# Patient Record
Sex: FEMALE | Race: ASIAN | NOT HISPANIC OR LATINO | Employment: FULL TIME
[De-identification: names, ages, dates, MRNs, and addresses within clinical notes are randomized per-mention and may not be internally consistent; named-entity substitution may affect disease eponyms.]

---

## 2018-04-02 ENCOUNTER — RECORDS - HEALTHEAST (OUTPATIENT)
Dept: ADMINISTRATIVE | Facility: OTHER | Age: 33
End: 2018-04-02

## 2019-10-18 ENCOUNTER — RECORDS - HEALTHEAST (OUTPATIENT)
Dept: LAB | Facility: CLINIC | Age: 34
End: 2019-10-18

## 2019-10-18 LAB
ALBUMIN SERPL-MCNC: 4 G/DL (ref 3.5–5)
ALP SERPL-CCNC: 39 U/L (ref 45–120)
ALT SERPL W P-5'-P-CCNC: 13 U/L (ref 0–45)
ANION GAP SERPL CALCULATED.3IONS-SCNC: 8 MMOL/L (ref 5–18)
AST SERPL W P-5'-P-CCNC: 16 U/L (ref 0–40)
BILIRUB SERPL-MCNC: 1 MG/DL (ref 0–1)
BUN SERPL-MCNC: 13 MG/DL (ref 8–22)
CALCIUM SERPL-MCNC: 8.8 MG/DL (ref 8.5–10.5)
CHLORIDE BLD-SCNC: 105 MMOL/L (ref 98–107)
CO2 SERPL-SCNC: 25 MMOL/L (ref 22–31)
CREAT SERPL-MCNC: 0.74 MG/DL (ref 0.6–1.1)
GFR SERPL CREATININE-BSD FRML MDRD: >60 ML/MIN/1.73M2
GLUCOSE BLD-MCNC: 97 MG/DL (ref 70–125)
LIPASE SERPL-CCNC: 21 U/L (ref 0–52)
POTASSIUM BLD-SCNC: 3.7 MMOL/L (ref 3.5–5)
PROT SERPL-MCNC: 7.5 G/DL (ref 6–8)
SODIUM SERPL-SCNC: 138 MMOL/L (ref 136–145)

## 2019-12-03 ENCOUNTER — RECORDS - HEALTHEAST (OUTPATIENT)
Dept: LAB | Facility: CLINIC | Age: 34
End: 2019-12-03

## 2019-12-05 LAB — BACTERIA SPEC CULT: NORMAL

## 2020-10-09 ENCOUNTER — RECORDS - HEALTHEAST (OUTPATIENT)
Dept: LAB | Facility: CLINIC | Age: 35
End: 2020-10-09

## 2020-10-09 LAB
BASOPHILS # BLD AUTO: 0 THOU/UL (ref 0–0.2)
BASOPHILS NFR BLD AUTO: 1 % (ref 0–2)
EOSINOPHIL # BLD AUTO: 0.1 THOU/UL (ref 0–0.4)
EOSINOPHIL NFR BLD AUTO: 1 % (ref 0–6)
ERYTHROCYTE [DISTWIDTH] IN BLOOD BY AUTOMATED COUNT: 12.7 % (ref 11–14.5)
HCT VFR BLD AUTO: 36.7 % (ref 35–47)
HGB BLD-MCNC: 12.2 G/DL (ref 12–16)
HIV 1+2 AB+HIV1 P24 AG SERPL QL IA: NEGATIVE
IMM GRANULOCYTES # BLD: 0 THOU/UL
IMM GRANULOCYTES NFR BLD: 0 %
LYMPHOCYTES # BLD AUTO: 1.1 THOU/UL (ref 0.8–4.4)
LYMPHOCYTES NFR BLD AUTO: 17 % (ref 20–40)
MCH RBC QN AUTO: 28.6 PG (ref 27–34)
MCHC RBC AUTO-ENTMCNC: 33.2 G/DL (ref 32–36)
MCV RBC AUTO: 86 FL (ref 80–100)
MONOCYTES # BLD AUTO: 0.3 THOU/UL (ref 0–0.9)
MONOCYTES NFR BLD AUTO: 4 % (ref 2–10)
NEUTROPHILS # BLD AUTO: 5 THOU/UL (ref 2–7.7)
NEUTROPHILS NFR BLD AUTO: 77 % (ref 50–70)
PLATELET # BLD AUTO: 236 THOU/UL (ref 140–440)
PMV BLD AUTO: 11 FL (ref 8.5–12.5)
RBC # BLD AUTO: 4.26 MILL/UL (ref 3.8–5.4)
WBC: 6.5 THOU/UL (ref 4–11)

## 2020-10-10 LAB
ANTIBODY SCREEN: NEGATIVE
T PALLIDUM AB SER QL: NEGATIVE

## 2020-10-11 LAB
BACTERIA SPEC CULT: ABNORMAL
BACTERIA SPEC CULT: ABNORMAL

## 2020-10-12 LAB
ABO/RH(D): NORMAL
ABORH REPEAT: NORMAL
RUBV IGG SERPL QL IA: POSITIVE

## 2020-10-13 LAB
C TRACH DNA SPEC QL PROBE+SIG AMP: NEGATIVE
HBV SURFACE AG SERPL QL IA: ABNORMAL
N GONORRHOEA DNA SPEC QL NAA+PROBE: NEGATIVE

## 2020-10-30 ENCOUNTER — COMMUNICATION - HEALTHEAST (OUTPATIENT)
Dept: SCHEDULING | Facility: CLINIC | Age: 35
End: 2020-10-30

## 2020-10-31 ENCOUNTER — COMMUNICATION - HEALTHEAST (OUTPATIENT)
Dept: SCHEDULING | Facility: CLINIC | Age: 35
End: 2020-10-31

## 2020-11-02 ENCOUNTER — AMBULATORY - HEALTHEAST (OUTPATIENT)
Dept: CARE COORDINATION | Facility: CLINIC | Age: 35
End: 2020-11-02

## 2020-11-02 DIAGNOSIS — U07.1 2019 NOVEL CORONAVIRUS DISEASE (COVID-19): ICD-10-CM

## 2020-11-03 ENCOUNTER — COMMUNICATION - HEALTHEAST (OUTPATIENT)
Dept: NURSING | Facility: CLINIC | Age: 35
End: 2020-11-03

## 2021-06-12 NOTE — PROGRESS NOTES
Clinic Care Coordination Contact    Clinic Care Coordination Contact     Follow Up Progress Note      Reason for Referral:      Intervention/Education provided during outreach: CHW spoke with patient. Review Hospital COVID discharge instructions. Patient will follow up with PCP @ [facility] and make appointment.     Plan: Patient will contact out of network PCP @ [facility] to schedule follow up appointment.     No further Outreach will be done at this time.

## 2021-06-12 NOTE — TELEPHONE ENCOUNTER
"Coronavirus (COVID-19) Notification    Caller Name (Patient, parent, daughter/sone, grandparent, etc)  Patient- Pa Stacey    Reason for call  Notify of Positive Coronavirus (COVID-19) lab results, assess symptoms,  review  [facility] [place] recommendations    Lab Result    Lab test:  2019-nCoV rRt-PCR or SARS-CoV-2 PCR    Oropharyngeal AND/OR nasopharyngeal swabs is POSITIVE for 2019-nCoV RNA/SARS-COV-2 PCR (COVID-19 virus)    RN Recommendations/Instructions per [facility] Coronavirus COVID-19 recommendations    Brief introduction script  Introduce self and then review script:  \"I am calling on behalf of [employer].  We were notified that your Coronavirus test (COVID-19) for was POSITIVE for the virus.  I have some information to relay to you but first I wanted to mention that the [place] Dept of Health will be contacting you shortly [it's possible MD already called Patient] to talk to you more about how you are feeling and other people you have had contact with who might now also have the virus.  Also, [facility] is Partnering with the [facility] for Covid-19 research, you may be contacted directly by research staff.\"    ssessment (Inquire about Patient's current symptoms)   Assessment   Current Symptoms at time of phone call: (if no symptoms, document No symptoms] No symptoms   Symptom onset (if applicable) No symptoms     If at time of call, Patients symptoms hare worsened, the Patient should contact 911 or have someone drive them to Emergency Dept promptly:      If Patient calling 911, inform 911 personal that you have tested positive for the Coronavirus (COVID-19).  Place mask on and await 911 to arrive.    If Emergency Dept, If possible, please have another adult drive you to the Emergency Dept but you need to wear mask when in contact with other people.      Review information with Patient    Your result was positive. This means you have COVID-19 (coronavirus).  We have sent you a letter " that reviews the information that I'll be reviewing with you now.    How can I protect others?    If you have symptoms: stay home and away from others (self-isolate) until:    You've had no fever--and no medicine that reduces fever--for 1 full day (24 hours). And      Your other symptoms have gotten better. For example, your cough or breathing has improved. And     At least 10 days have passed since your symptoms started. (If you ve been told by a doctor that you have a weak immune system, wait 20 days.)     If you don't have symptoms: Stay home and away from others (self-isolate) until at least 10 days have passed since your first positive COVID-19 test. (Date test collected).    During this time:    Stay in your own room, including for meals. Use your own bathroom if you can.    Stay away from others in your home. No hugging, kissing or shaking hands. No visitors.     Don't go to work, school or anywhere else.     Clean  high touch  surfaces often (doorknobs, counters, handles, etc.). Use a household cleaning spray or wipes. You'll find a full list on the EPA website at www.epa.gov/pesticide-registration/list-n-disinfectants-use-against-sars-cov-2.     Cover your mouth and nose with a mask, tissue or other face covering to avoid spreading germs.    Wash your hands and face often with soap and water.    Caregivers in these groups are at risk for severe illness due to COVID-19:  o People 65 years and older  o People who live in a nursing home or long-term care facility  o People with chronic disease (lung, heart, cancer, diabetes, kidney, liver, immunologic)  o People who have a weakened immune system, including those who:  - Are in cancer treatment  - Take medicine that weakens the immune system, such as corticosteroids  - Had a bone marrow or organ transplant  - Have an immune deficiency  - Have poorly controlled HIV or AIDS  - Are obese (body mass index of 40 or higher)  - Smoke regularly    Caregivers should  wear gloves while washing dishes, handling laundry and cleaning bedrooms and bathrooms.    Wash and dry laundry with special caution. Don't shake dirty laundry, and use the warmest water setting you can.    If you have a weakened immune system, ask your doctor about other actions you should take.    For more tips, go to www.cdc.gov/coronavirus/2019-ncov/downloads/10Things.pdf.    You should not go back to work until you meet the guidelines above for ending your home isolation. You don't need to be retested for COVID-19 before going back to work--studies show that you won't spread the virus if it's been at least 10 days since your symptoms started (or 20 days, if you have a weak immune system).    Employers: This document serves as formal notice of your employee's medical guidelines for going back to work. They must meet the above guidelines before going back to work in person.    How can I take care of myself?    1. Get lots of rest. Drink extra fluids (unless a doctor has told you not to).    2. Take Tylenol (acetaminophen) for fever or pain. If you have liver or kidney problems, ask your family doctor if it's okay to take Tylenol.     Take either:     650 mg (two 325 mg pills) every 4 to 6 hours, or     1,000 mg (two 500 mg pills) every 8 hours as needed.     Note: Don't take more than 3,000 mg in one day. Acetaminophen is found in many medicines (both prescribed and over-the-counter medicines). Read all labels to be sure you don't take too much.    For children, check the Tylenol bottle for the right dose (based on their age or weight).    3. If you have other health problems (like cancer, heart failure, an organ transplant or severe kidney disease): Call your specialty clinic if you don't feel better in the next 2 days.    4. Know when to call 911: Emergency warning signs include:    Trouble breathing or shortness of breath    Pain or pressure in the chest that doesn't go away    Feeling confused like you  haven't felt before, or not being able to wake up    Bluish-colored lips or face    5. Sign up for [employer]. We know it's scary to hear that you have COVID-19. We want to track your symptoms to make sure you're okay over the next 2 weeks. Please look for an email from [employer]--this is a free, online program that we'll use to keep in touch. To sign up, follow the link in the email. Learn more at [email or web address].    Where can I get more information?    [facility]: www.[employer] [email or web address]/    Coronavirus Basics: www.health.[facility].[place]./diseases/coronavirus/basics.html    What to Do If You're Sick: www.cdc.gov/coronavirus/2019-ncov/about/steps-when-sick.html    Ending Home Isolation: www.cdc.gov/coronavirus/2019-ncov/hcp/disposition-in-home-patients.html     Caring for Someone with COVID-19: www.cdc.gov/coronavirus/2019-ncov/if-you-are-sick/care-for-someone.html     [facility] clinical trials (COVID-19 research studies): clinicalaffairs.[facility]-clinical-trials     A Positive COVID-19 letter will be sent via [employer] or the Mail.  (Exception, no letters sent to Presurgerical/Preprocedure Patients)    Jennifer Salas RN/[facility]

## 2021-06-12 NOTE — TELEPHONE ENCOUNTER
Tested and found to be positive. Wondering about pregnancy. Recommended she notify her [facility] provider who is caring for her during her pregnancy. She verbalized understanding.

## 2021-06-16 PROBLEM — Z34.90 PREGNANT: Status: ACTIVE | Noted: 2020-12-30

## 2021-06-16 PROBLEM — O02.1 IUFD AT LESS THAN 20 WEEKS OF GESTATION: Status: ACTIVE | Noted: 2020-12-30

## 2021-06-27 ENCOUNTER — HEALTH MAINTENANCE LETTER (OUTPATIENT)
Age: 36
End: 2021-06-27

## 2021-10-17 ENCOUNTER — HEALTH MAINTENANCE LETTER (OUTPATIENT)
Age: 36
End: 2021-10-17

## 2021-11-11 LAB
ABO (EXTERNAL): NORMAL
HEPATITIS B SURFACE ANTIGEN (EXTERNAL): REACTIVE
HEPATITIS C ANTIBODY (EXTERNAL): NONREACTIVE
HIV1+2 AB SERPL QL IA: NONREACTIVE
RH (EXTERNAL): POSITIVE
RUBELLA ANTIBODY IGG (EXTERNAL): NORMAL

## 2021-12-31 ENCOUNTER — HOSPITAL ENCOUNTER (EMERGENCY)
Facility: HOSPITAL | Age: 36
Discharge: HOME | End: 2021-12-31
Attending: STUDENT IN AN ORGANIZED HEALTH CARE EDUCATION/TRAINING PROGRAM | Admitting: STUDENT IN AN ORGANIZED HEALTH CARE EDUCATION/TRAINING PROGRAM
Payer: COMMERCIAL

## 2021-12-31 VITALS
WEIGHT: 110 LBS | OXYGEN SATURATION: 99 % | RESPIRATION RATE: 18 BRPM | DIASTOLIC BLOOD PRESSURE: 72 MMHG | TEMPERATURE: 97.9 F | BODY MASS INDEX: 22.22 KG/M2 | SYSTOLIC BLOOD PRESSURE: 113 MMHG | HEART RATE: 111 BPM

## 2021-12-31 DIAGNOSIS — Z20.822 SUSPECTED 2019 NOVEL CORONAVIRUS INFECTION: ICD-10-CM

## 2021-12-31 LAB
FLUAV RNA SPEC QL NAA+PROBE: POSITIVE
FLUBV RNA RESP QL NAA+PROBE: NEGATIVE
SARS-COV-2 RNA RESP QL NAA+PROBE: NEGATIVE

## 2021-12-31 PROCEDURE — C9803 HOPD COVID-19 SPEC COLLECT: HCPCS

## 2021-12-31 PROCEDURE — 99283 EMERGENCY DEPT VISIT LOW MDM: CPT

## 2021-12-31 PROCEDURE — 87636 SARSCOV2 & INF A&B AMP PRB: CPT | Performed by: STUDENT IN AN ORGANIZED HEALTH CARE EDUCATION/TRAINING PROGRAM

## 2021-12-31 NOTE — ED TRIAGE NOTES
Pt reports headache, cough, runny nose, chills since Tuesday. Is 17 weeks pregnant. Vaccinated for Covid. At home Covid test taken yesterday, which was negative. No abdominal pain.

## 2021-12-31 NOTE — ED PROVIDER NOTES
EMERGENCY DEPARTMENT ENCOUNTER      NAME: Gwyn Ibarra  AGE: 36 year old female  YOB: 1985  MRN: [id number]  EVALUATION DATE & TIME: No admission date for patient encounter.    PCP: No primary care provider on file.    ED PROVIDER: Akash Braun M.D.      Chief Complaint   Patient presents with     Covid Concern         FINAL IMPRESSION:  1. Suspected 2019 novel coronavirus infection          ED COURSE & MEDICAL DECISION MAKING:    Pertinent Labs & Imaging studies reviewed. (See chart for details)  36 year old female presents to the Emergency Department for evaluation of sore throat, mild headache and some weakness.  Also has very mild body aches.  Felt symptoms were consistent with Covid.  Patient nontoxic and generally well-appearing.  We will swab him and discharge him home.    At the conclusion of the encounter I discussed the results of all of the tests and the disposition. The questions were answered. The patient or family acknowledged understanding and was agreeable with the care plan.     MEDICATIONS GIVEN IN THE EMERGENCY:  Medications - No data to display    NEW PRESCRIPTIONS STARTED AT TODAY'S ER VISIT  Discharge Medication List as of 12/31/2021 12:48 PM             =================================================================    HPI    Patient information was obtained from: Patient and         Gwyn Ibarra is a 36 year old female who is 17 weeks pregnant who presents to this EDfor evaluation of COVID-19 symptoms.  Patient has had a sore throat mild headache and some generalized body aches.  She has also had a low-grade fever.  She is 17 weeks pregnant.  She denies any significant chest pain certainly no tearing chest pain.  No shortness of breath.  No other symptoms.  She is having no belly pain or cramping.  No vaginal discharge.      REVIEW OF SYSTEMS   Review of Systems       PAST MEDICAL HISTORY:  No past medical history on file.    PAST SURGICAL HISTORY:  Past Surgical History:    Procedure Laterality Date     LAPAROSCOPY FOR ECTOPIC PREGNANCY Right 10/27/2010     LAPAROSCOPY FOR ECTOPIC PREGNANCY Left 11/27/2012           CURRENT MEDICATIONS:    ibuprofen (ADVIL,MOTRIN) 800 MG tablet        ALLERGIES:  No Known Allergies    FAMILY HISTORY:  No family history on file.    SOCIAL HISTORY:   Social History     Socioeconomic History     Marital status:      Spouse name: Not on file     Number of children: 3     Years of education: Not on file     Highest education level: Not on file   Occupational History     Not on file   Tobacco Use     Smoking status: Never Smoker     Smokeless tobacco: Never Used   Substance and Sexual Activity     Alcohol use: Not Currently     Drug use: Never     Sexual activity: Yes   Other Topics Concern     Not on file   Social History Narrative     Not on file     Social Determinants of Health     Financial Resource Strain: Not on file   Food Insecurity: Not on file   Transportation Needs: Not on file   Physical Activity: Not on file   Stress: Not on file   Social Connections: Not on file   Intimate Partner Violence: Not on file   Housing Stability: Not on file       VITALS:  /72   Pulse 111   Temp 97.9  F (36.6  C) (Temporal)   Resp 18   Wt 49.9 kg (110 lb)   SpO2 99%   BMI 22.22 kg/m        PHYSICAL EXAM    PHYSICAL EXAM    VITAL SIGNS: /72   Pulse 111   Temp 97.9  F (36.6  C) (Temporal)   Resp 18   Wt 49.9 kg (110 lb)   SpO2 99%   BMI 22.22 kg/m    Constitutional:  Well developed, well nourished,  EYES: Conjunctivae clear, no discharge  HENT: Atraumatic, normocephalic, bilateral external ears normal.  Oropharynx moist. Nose normal.   Neck: Normal ROM , Supple   Respiratory:  No respiratory distress, normal nonlabored respirations.   Cardiovascular:  Distal perfusion appears intact  Musculoskeletal:  No edema appreciated, No cyanosis, No clubbing. Good range of motion in all major joints.   Integument:  Warm, Dry, No erythema, No rash.    Neurologic:  Alert and oriented. No focal deficits noted.  Ambulatory  Psychiatric:  Affect normal       LAB:  All pertinent labs reviewed and interpreted.  Labs Ordered and Resulted from Time of ED Arrival to Time of ED Departure - No data to display    RADIOLOGY:  Reviewed all pertinent imaging. Please see official radiology report.  No orders to display         I have independently reviewed and interpreted the EKG(s) documented above.          Akash Braun M.D.  Emergency Medicine  [facility] EMERGENCY DEPARTMENT  [facility] [facility] [id number] [phone]  Dept: [phone]       Akash Braun MD  01/01/22 [id number]

## 2022-03-29 LAB — TREPONEMA PALLIDUM ANTIBODY (EXTERNAL): NONREACTIVE

## 2022-04-09 ENCOUNTER — HOSPITAL ENCOUNTER (OUTPATIENT)
Facility: HOSPITAL | Age: 37
End: 2022-04-09
Admitting: OBSTETRICS & GYNECOLOGY
Payer: COMMERCIAL

## 2022-04-09 ENCOUNTER — HOSPITAL ENCOUNTER (OUTPATIENT)
Facility: HOSPITAL | Age: 37
Discharge: HOME | End: 2022-04-09
Attending: OBSTETRICS & GYNECOLOGY | Admitting: OBSTETRICS & GYNECOLOGY
Payer: COMMERCIAL

## 2022-04-09 VITALS — SYSTOLIC BLOOD PRESSURE: 103 MMHG | TEMPERATURE: 97.8 F | DIASTOLIC BLOOD PRESSURE: 74 MMHG | RESPIRATION RATE: 16 BRPM

## 2022-04-09 PROBLEM — Z36.89 ENCOUNTER FOR TRIAGE IN PREGNANT PATIENT: Status: ACTIVE | Noted: 2022-04-09

## 2022-04-09 LAB
ALBUMIN UR-MCNC: NEGATIVE MG/DL
APPEARANCE UR: CLEAR
BILIRUB UR QL STRIP: NEGATIVE
CLUE CELLS: ABNORMAL
COLOR UR AUTO: COLORLESS
GLUCOSE UR STRIP-MCNC: NEGATIVE MG/DL
HGB UR QL STRIP: NEGATIVE
KETONES UR STRIP-MCNC: NEGATIVE MG/DL
LEUKOCYTE ESTERASE UR QL STRIP: NEGATIVE
NITRATE UR QL: NEGATIVE
PH UR STRIP: 7 [PH] (ref 5–7)
SP GR UR STRIP: 1.01 (ref 1–1.03)
TRICHOMONAS, WET PREP: ABNORMAL
UROBILINOGEN UR STRIP-MCNC: <2 MG/DL
WBC'S/HIGH POWER FIELD, WET PREP: ABNORMAL
YEAST, WET PREP: ABNORMAL

## 2022-04-09 PROCEDURE — 87210 SMEAR WET MOUNT SALINE/INK: CPT | Performed by: OBSTETRICS & GYNECOLOGY

## 2022-04-09 PROCEDURE — G0463 HOSPITAL OUTPT CLINIC VISIT: HCPCS

## 2022-04-09 PROCEDURE — 81003 URINALYSIS AUTO W/O SCOPE: CPT | Performed by: OBSTETRICS & GYNECOLOGY

## 2022-04-09 RX ORDER — PRENATAL VIT/IRON FUM/FOLIC AC 27MG-0.8MG
1 TABLET ORAL DAILY
COMMUNITY

## 2022-04-09 RX ORDER — LIDOCAINE 40 MG/G
CREAM TOPICAL
Status: DISCONTINUED | OUTPATIENT
Start: 2022-04-09 | End: 2022-04-09 | Stop reason: HOSPADM

## 2022-04-09 NOTE — PROVIDER NOTIFICATION
Notified Dr Briscoe of negative UA and neg wet prep. Instructed to check cervix and discharge if not dilated.

## 2022-04-09 NOTE — DISCHARGE INSTRUCTIONS
Discharge Instruction for Undelivered Patients      You were seen for: Labor Assessment  We Consulted: Dr Briscoe  You had (Test or Medicine):UA, Wet prep, Fetal monitoring     Diet:   You may eat meals and snacks.     Activity:      Call your provider if you notice:  Swelling in your face or increased swelling in your hands or legs.  Headaches that are not relieved by Tylenol (acetaminophen).  Changes in your vision (blurring: seeing spots or stars.)  Nausea (sick to your stomach) and vomiting (throwing up).   Weight gain of 5 pounds or more per week.  Heartburn that doesn't go away.  Signs of bladder infection: pain when you urinate (use the toilet), need to go more often and more urgently.  The bag of doty (rupture of membranes) breaks, or you notice leaking in your underwear.  Bright red blood in your underwear.  Abdominal (lower belly) or stomach pain.  For first baby: Contractions (tightening) less than 5 minutes apart for one hour or more.  Second (plus) baby: Contractions (tightening) less than 10 minutes apart and getting stronger.  *If less than 34 weeks: Contractions (tightening) more than 6 times in one hour.  Increase or change in vaginal discharge (note the color and amount)    Follow-up:  As scheduled in the clinic

## 2022-04-09 NOTE — PROGRESS NOTES
Pa states she woke up around [place] with contraction pain that didn't go away so she came to the hospital. She states since she arrived here she feels much better. However her EUFM shows some uterine irritability with contractions about every 10 minutes. Baby is Cat 1 tracing with audible movements. Pa reports she has been voiding more frequently and has a bit of discomfort with it. UA sent to lab.

## 2022-05-19 LAB — GROUP B STREPTOCOCCUS (EXTERNAL): POSITIVE

## 2022-05-24 ENCOUNTER — HOSPITAL ENCOUNTER (INPATIENT)
Facility: HOSPITAL | Age: 37
LOS: 2 days | Discharge: HOME | End: 2022-05-26
Attending: OBSTETRICS & GYNECOLOGY | Admitting: OBSTETRICS & GYNECOLOGY
Payer: COMMERCIAL

## 2022-05-24 LAB
ABO/RH(D): NORMAL
ANTIBODY SCREEN: NEGATIVE
HOLD SPECIMEN: NORMAL
SARS-COV-2 RNA RESP QL NAA+PROBE: NEGATIVE
SPECIMEN EXPIRATION DATE: NORMAL
T PALLIDUM AB SER QL: NONREACTIVE

## 2022-05-24 PROCEDURE — 250N000013 HC RX MED GY IP 250 OP 250 PS 637: Performed by: OBSTETRICS & GYNECOLOGY

## 2022-05-24 PROCEDURE — 86901 BLOOD TYPING SEROLOGIC RH(D): CPT | Performed by: OBSTETRICS & GYNECOLOGY

## 2022-05-24 PROCEDURE — 722N000001 HC LABOR CARE VAGINAL DELIVERY SINGLE

## 2022-05-24 PROCEDURE — 250N000011 HC RX IP 250 OP 636: Performed by: OBSTETRICS & GYNECOLOGY

## 2022-05-24 PROCEDURE — 258N000003 HC RX IP 258 OP 636: Performed by: OBSTETRICS & GYNECOLOGY

## 2022-05-24 PROCEDURE — 87635 SARS-COV-2 COVID-19 AMP PRB: CPT | Performed by: OBSTETRICS & GYNECOLOGY

## 2022-05-24 PROCEDURE — 10907ZC DRAINAGE OF AMNIOTIC FLUID, THERAPEUTIC FROM PRODUCTS OF CONCEPTION, VIA NATURAL OR ARTIFICIAL OPENING: ICD-10-PCS | Performed by: OBSTETRICS & GYNECOLOGY

## 2022-05-24 PROCEDURE — 250N000011 HC RX IP 250 OP 636

## 2022-05-24 PROCEDURE — 120N000001 HC R&B MED SURG/OB

## 2022-05-24 PROCEDURE — 250N000009 HC RX 250: Performed by: OBSTETRICS & GYNECOLOGY

## 2022-05-24 PROCEDURE — 36415 COLL VENOUS BLD VENIPUNCTURE: CPT | Performed by: OBSTETRICS & GYNECOLOGY

## 2022-05-24 PROCEDURE — 86780 TREPONEMA PALLIDUM: CPT | Performed by: OBSTETRICS & GYNECOLOGY

## 2022-05-24 RX ORDER — NALOXONE HYDROCHLORIDE 0.4 MG/ML
0.2 INJECTION, SOLUTION INTRAMUSCULAR; INTRAVENOUS; SUBCUTANEOUS
Status: DISCONTINUED | OUTPATIENT
Start: 2022-05-24 | End: 2022-05-24 | Stop reason: HOSPADM

## 2022-05-24 RX ORDER — PENICILLIN G 3000000 [IU]/50ML
3 INJECTION, SOLUTION INTRAVENOUS EVERY 4 HOURS
Status: DISCONTINUED | OUTPATIENT
Start: 2022-05-24 | End: 2022-05-24 | Stop reason: HOSPADM

## 2022-05-24 RX ORDER — PROCHLORPERAZINE MALEATE 10 MG
10 TABLET ORAL EVERY 6 HOURS PRN
Status: DISCONTINUED | OUTPATIENT
Start: 2022-05-24 | End: 2022-05-24 | Stop reason: HOSPADM

## 2022-05-24 RX ORDER — MISOPROSTOL 200 UG/1
800 TABLET ORAL
Status: DISCONTINUED | OUTPATIENT
Start: 2022-05-24 | End: 2022-05-24 | Stop reason: HOSPADM

## 2022-05-24 RX ORDER — LIDOCAINE 40 MG/G
CREAM TOPICAL
Status: DISCONTINUED | OUTPATIENT
Start: 2022-05-24 | End: 2022-05-24 | Stop reason: HOSPADM

## 2022-05-24 RX ORDER — HYDROCORTISONE 25 MG/G
CREAM TOPICAL 3 TIMES DAILY PRN
Status: DISCONTINUED | OUTPATIENT
Start: 2022-05-24 | End: 2022-05-26 | Stop reason: HOSPADM

## 2022-05-24 RX ORDER — OXYTOCIN/0.9 % SODIUM CHLORIDE 30/500 ML
1-24 PLASTIC BAG, INJECTION (ML) INTRAVENOUS CONTINUOUS
Status: DISCONTINUED | OUTPATIENT
Start: 2022-05-24 | End: 2022-05-24 | Stop reason: HOSPADM

## 2022-05-24 RX ORDER — METHYLERGONOVINE MALEATE 0.2 MG/ML
200 INJECTION INTRAVENOUS
Status: DISCONTINUED | OUTPATIENT
Start: 2022-05-24 | End: 2022-05-26 | Stop reason: HOSPADM

## 2022-05-24 RX ORDER — OXYTOCIN/0.9 % SODIUM CHLORIDE 30/500 ML
100-340 PLASTIC BAG, INJECTION (ML) INTRAVENOUS CONTINUOUS PRN
Status: DISCONTINUED | OUTPATIENT
Start: 2022-05-24 | End: 2022-05-26 | Stop reason: HOSPADM

## 2022-05-24 RX ORDER — MISOPROSTOL 200 UG/1
400 TABLET ORAL
Status: DISCONTINUED | OUTPATIENT
Start: 2022-05-24 | End: 2022-05-26 | Stop reason: HOSPADM

## 2022-05-24 RX ORDER — ACETAMINOPHEN 325 MG/1
650 TABLET ORAL EVERY 4 HOURS PRN
Status: DISCONTINUED | OUTPATIENT
Start: 2022-05-24 | End: 2022-05-26 | Stop reason: HOSPADM

## 2022-05-24 RX ORDER — NALOXONE HYDROCHLORIDE 0.4 MG/ML
0.4 INJECTION, SOLUTION INTRAMUSCULAR; INTRAVENOUS; SUBCUTANEOUS
Status: DISCONTINUED | OUTPATIENT
Start: 2022-05-24 | End: 2022-05-24 | Stop reason: HOSPADM

## 2022-05-24 RX ORDER — METOCLOPRAMIDE HYDROCHLORIDE 5 MG/ML
10 INJECTION INTRAMUSCULAR; INTRAVENOUS EVERY 6 HOURS PRN
Status: DISCONTINUED | OUTPATIENT
Start: 2022-05-24 | End: 2022-05-24 | Stop reason: HOSPADM

## 2022-05-24 RX ORDER — CARBOPROST TROMETHAMINE 250 UG/ML
250 INJECTION, SOLUTION INTRAMUSCULAR
Status: DISCONTINUED | OUTPATIENT
Start: 2022-05-24 | End: 2022-05-24 | Stop reason: HOSPADM

## 2022-05-24 RX ORDER — ONDANSETRON 4 MG/1
4 TABLET, ORALLY DISINTEGRATING ORAL EVERY 6 HOURS PRN
Status: DISCONTINUED | OUTPATIENT
Start: 2022-05-24 | End: 2022-05-24 | Stop reason: HOSPADM

## 2022-05-24 RX ORDER — METHYLERGONOVINE MALEATE 0.2 MG/ML
200 INJECTION INTRAVENOUS
Status: DISCONTINUED | OUTPATIENT
Start: 2022-05-24 | End: 2022-05-24 | Stop reason: HOSPADM

## 2022-05-24 RX ORDER — MISOPROSTOL 200 UG/1
400 TABLET ORAL
Status: DISCONTINUED | OUTPATIENT
Start: 2022-05-24 | End: 2022-05-24 | Stop reason: HOSPADM

## 2022-05-24 RX ORDER — KETOROLAC TROMETHAMINE 30 MG/ML
30 INJECTION, SOLUTION INTRAMUSCULAR; INTRAVENOUS
Status: DISCONTINUED | OUTPATIENT
Start: 2022-05-24 | End: 2022-05-26 | Stop reason: HOSPADM

## 2022-05-24 RX ORDER — MISOPROSTOL 200 UG/1
800 TABLET ORAL
Status: DISCONTINUED | OUTPATIENT
Start: 2022-05-24 | End: 2022-05-26 | Stop reason: HOSPADM

## 2022-05-24 RX ORDER — MODIFIED LANOLIN
OINTMENT (GRAM) TOPICAL
Status: DISCONTINUED | OUTPATIENT
Start: 2022-05-24 | End: 2022-05-26 | Stop reason: HOSPADM

## 2022-05-24 RX ORDER — OXYTOCIN 10 [USP'U]/ML
10 INJECTION, SOLUTION INTRAMUSCULAR; INTRAVENOUS
Status: DISCONTINUED | OUTPATIENT
Start: 2022-05-24 | End: 2022-05-26 | Stop reason: HOSPADM

## 2022-05-24 RX ORDER — OXYTOCIN 10 [USP'U]/ML
10 INJECTION, SOLUTION INTRAMUSCULAR; INTRAVENOUS
Status: DISCONTINUED | OUTPATIENT
Start: 2022-05-24 | End: 2022-05-24 | Stop reason: HOSPADM

## 2022-05-24 RX ORDER — FENTANYL CITRATE 50 UG/ML
50 INJECTION, SOLUTION INTRAMUSCULAR; INTRAVENOUS EVERY 30 MIN PRN
Status: DISCONTINUED | OUTPATIENT
Start: 2022-05-24 | End: 2022-05-24 | Stop reason: HOSPADM

## 2022-05-24 RX ORDER — DOCUSATE SODIUM 100 MG/1
100 CAPSULE, LIQUID FILLED ORAL DAILY
Status: DISCONTINUED | OUTPATIENT
Start: 2022-05-24 | End: 2022-05-26 | Stop reason: HOSPADM

## 2022-05-24 RX ORDER — OXYTOCIN/0.9 % SODIUM CHLORIDE 30/500 ML
340 PLASTIC BAG, INJECTION (ML) INTRAVENOUS CONTINUOUS PRN
Status: DISCONTINUED | OUTPATIENT
Start: 2022-05-24 | End: 2022-05-26 | Stop reason: HOSPADM

## 2022-05-24 RX ORDER — SODIUM CHLORIDE, SODIUM LACTATE, POTASSIUM CHLORIDE, CALCIUM CHLORIDE 600; 310; 30; 20 MG/100ML; MG/100ML; MG/100ML; MG/100ML
INJECTION, SOLUTION INTRAVENOUS CONTINUOUS PRN
Status: DISCONTINUED | OUTPATIENT
Start: 2022-05-24 | End: 2022-05-24 | Stop reason: HOSPADM

## 2022-05-24 RX ORDER — PENICILLIN G POTASSIUM 5000000 [IU]/1
5 INJECTION, POWDER, FOR SOLUTION INTRAMUSCULAR; INTRAVENOUS ONCE
Status: COMPLETED | OUTPATIENT
Start: 2022-05-24 | End: 2022-05-24

## 2022-05-24 RX ORDER — ONDANSETRON 2 MG/ML
4 INJECTION INTRAMUSCULAR; INTRAVENOUS EVERY 6 HOURS PRN
Status: DISCONTINUED | OUTPATIENT
Start: 2022-05-24 | End: 2022-05-24 | Stop reason: HOSPADM

## 2022-05-24 RX ORDER — BISACODYL 10 MG
10 SUPPOSITORY, RECTAL RECTAL DAILY PRN
Status: DISCONTINUED | OUTPATIENT
Start: 2022-05-24 | End: 2022-05-26 | Stop reason: HOSPADM

## 2022-05-24 RX ORDER — PROCHLORPERAZINE 25 MG
25 SUPPOSITORY, RECTAL RECTAL EVERY 12 HOURS PRN
Status: DISCONTINUED | OUTPATIENT
Start: 2022-05-24 | End: 2022-05-24 | Stop reason: HOSPADM

## 2022-05-24 RX ORDER — CITRIC ACID/SODIUM CITRATE 334-500MG
30 SOLUTION, ORAL ORAL
Status: DISCONTINUED | OUTPATIENT
Start: 2022-05-24 | End: 2022-05-24 | Stop reason: HOSPADM

## 2022-05-24 RX ORDER — OXYTOCIN/0.9 % SODIUM CHLORIDE 30/500 ML
340 PLASTIC BAG, INJECTION (ML) INTRAVENOUS CONTINUOUS PRN
Status: DISCONTINUED | OUTPATIENT
Start: 2022-05-24 | End: 2022-05-24 | Stop reason: HOSPADM

## 2022-05-24 RX ORDER — IBUPROFEN 800 MG/1
800 TABLET, FILM COATED ORAL EVERY 6 HOURS PRN
Status: DISCONTINUED | OUTPATIENT
Start: 2022-05-24 | End: 2022-05-26 | Stop reason: HOSPADM

## 2022-05-24 RX ORDER — IBUPROFEN 800 MG/1
800 TABLET, FILM COATED ORAL
Status: DISCONTINUED | OUTPATIENT
Start: 2022-05-24 | End: 2022-05-26 | Stop reason: HOSPADM

## 2022-05-24 RX ORDER — CARBOPROST TROMETHAMINE 250 UG/ML
250 INJECTION, SOLUTION INTRAMUSCULAR
Status: DISCONTINUED | OUTPATIENT
Start: 2022-05-24 | End: 2022-05-26 | Stop reason: HOSPADM

## 2022-05-24 RX ORDER — METOCLOPRAMIDE 10 MG/1
10 TABLET ORAL EVERY 6 HOURS PRN
Status: DISCONTINUED | OUTPATIENT
Start: 2022-05-24 | End: 2022-05-24 | Stop reason: HOSPADM

## 2022-05-24 RX ORDER — FENTANYL CITRATE 50 UG/ML
INJECTION, SOLUTION INTRAMUSCULAR; INTRAVENOUS
Status: COMPLETED
Start: 2022-05-24 | End: 2022-05-24

## 2022-05-24 RX ADMIN — PENICILLIN G POTASSIUM 5 MILLION UNITS: 5000000 POWDER, FOR SOLUTION INTRAMUSCULAR; INTRAPLEURAL; INTRATHECAL; INTRAVENOUS at 06:34

## 2022-05-24 RX ADMIN — DOCUSATE SODIUM 100 MG: 100 CAPSULE, LIQUID FILLED ORAL at 23:23

## 2022-05-24 RX ADMIN — FENTANYL CITRATE 50 MCG: 50 INJECTION INTRAMUSCULAR; INTRAVENOUS at 19:00

## 2022-05-24 RX ADMIN — Medication 1 MILLI-UNITS/MIN: at 14:02

## 2022-05-24 RX ADMIN — PENICILLIN G 3 MILLION UNITS: 3000000 INJECTION, SOLUTION INTRAVENOUS at 10:30

## 2022-05-24 RX ADMIN — FENTANYL CITRATE 50 MCG: 50 INJECTION INTRAMUSCULAR; INTRAVENOUS at 19:30

## 2022-05-24 RX ADMIN — KETOROLAC TROMETHAMINE 30 MG: 30 INJECTION, SOLUTION INTRAMUSCULAR at 21:35

## 2022-05-24 RX ADMIN — PENICILLIN G 3 MILLION UNITS: 3000000 INJECTION, SOLUTION INTRAVENOUS at 18:52

## 2022-05-24 RX ADMIN — WITCH HAZEL: 500 SOLUTION RECTAL; TOPICAL at 23:20

## 2022-05-24 RX ADMIN — SODIUM CHLORIDE, POTASSIUM CHLORIDE, SODIUM LACTATE AND CALCIUM CHLORIDE: 600; 310; 30; 20 INJECTION, SOLUTION INTRAVENOUS at 18:07

## 2022-05-24 RX ADMIN — PENICILLIN G 3 MILLION UNITS: 3000000 INJECTION, SOLUTION INTRAVENOUS at 14:35

## 2022-05-24 RX ADMIN — SODIUM CHLORIDE, POTASSIUM CHLORIDE, SODIUM LACTATE AND CALCIUM CHLORIDE 500 ML: 600; 310; 30; 20 INJECTION, SOLUTION INTRAVENOUS at 06:33

## 2022-05-24 RX ADMIN — LIDOCAINE HYDROCHLORIDE 5 ML: 10 INJECTION, SOLUTION EPIDURAL; INFILTRATION; INTRACAUDAL; PERINEURAL at 20:29

## 2022-05-24 RX ADMIN — BENZOCAINE AND LEVOMENTHOL: 200; 5 SPRAY TOPICAL at 23:20

## 2022-05-24 ASSESSMENT — ACTIVITIES OF DAILY LIVING (ADL)
WEAR_GLASSES_OR_BLIND: NO
WALKING_OR_CLIMBING_STAIRS_DIFFICULTY: NO
CHANGE_IN_FUNCTIONAL_STATUS_SINCE_ONSET_OF_CURRENT_ILLNESS/INJURY: NO
FALL_HISTORY_WITHIN_LAST_SIX_MONTHS: NO
DRESSING/BATHING_DIFFICULTY: NO
TOILETING_ISSUES: NO
DIFFICULTY_COMMUNICATING: NO
DIFFICULTY_EATING/SWALLOWING: NO
CONCENTRATING,_REMEMBERING_OR_MAKING_DECISIONS_DIFFICULTY: NO
DOING_ERRANDS_INDEPENDENTLY_DIFFICULTY: NO

## 2022-05-24 NOTE — H&P
[facility]    History and Physical       Date of Admission:  2022    History of Present Illness   Gwyn Ibarra is a 36 year old female  37w3d who presents in labor.     Estimated Date of Delivery: 2022 is calculated from IVF transfer date.     OB COMPLICATIONS:  - IVF pregnancy  - Hepatitis B positive   - AMA: negative NIPT  - Hx ectopic pregnancy x2 s/p laparotomy    - Hx 20w IUFD ()  - GBS positive    Past Medical History    History reviewed. No pertinent past medical history.    Past Surgical History   Past Surgical History:   Procedure Laterality Date     LAPAROSCOPY FOR ECTOPIC PREGNANCY Right 10/27/2010     LAPAROSCOPY FOR ECTOPIC PREGNANCY Left 2012       OB History    Para Term  AB Living   7 3 3 0 3 3   SAB IAB Ectopic Multiple Live Births   0 0 2 0 3      # Outcome Date GA Lbr Dony/2nd Weight Sex Delivery Anes PTL Lv   7 Current            6 AB 20 19w4d  0.1 kg (3.5 oz)   None  FD      Name: BINH,BABY-PA FD      Apgar1: 0  Apgar5: 0   5 Ectopic 12              Birth Comments: left   4 Ectopic 10/26/10              Birth Comments: right   3 Term 10/29/07   2.92 kg (6 lb 7 oz) M Vag-Spont   SEKOU   2 Term 01/10/05   2.892 kg (6 lb 6 oz) F Vag-Spont   SEKOU   1 Term 03   2.892 kg (6 lb 6 oz) M Vag-Spont   SEKOU      Social History   Social History     Tobacco Use     Smoking status: Never Smoker     Smokeless tobacco: Never Used   Substance Use Topics     Alcohol use: Not Currently     Drug use: Never      Family History   History reviewed. No pertinent family history.     Prior to Admission Medications   Prior to Admission Medications   Prescriptions Last Dose Informant Patient Reported? Taking?   Prenatal Vit-Fe Fumarate-FA (PRENATAL MULTIVITAMIN W/IRON) 27-0.8 MG tablet 2022 at Unknown time  Yes Yes   Sig: Take 1 tablet by mouth daily   doxylamine (UNISOM) 25 MG TABS tablet Past Month at Unknown time  Yes Yes   Sig: Take 12.5  mg by mouth At Bedtime As needed if unable to fall asleep.      Facility-Administered Medications: None     Allergies   No Known Allergies    Physical Exam   Vital Signs with Ranges  Temp:  [98.2  F (36.8  C)] 98.2  F (36.8  C)  Resp:  [16] 16    Gen: no acute distress, resting comfortably   SVE: /-3 per RN  Abd: gravid, soft, nontender   Extremities: soft, nontender     Recent Labs   Lab Test 20  1018   AS Negative     Recent Labs   Lab Test 22  0000 10/09/20  0935   HEPBANG  --  Positive, Confirm*   HIAGAB  --  Negative   GBS Positive*  --    RUQIGG  --  Positive     BSUS: Vertex    Fetal Heart Tones: 145/mod tiago/+accel/-decel  TOCO:  q2-3min    Assessment & Plan   Gwyn MOORE Stacey is a 37yo  37w3d in labor  Hep B positive   Cat I FHT     PLAN:   1. Vitals: wnl  2. FHT: Reassuring  3. Labor:  - Continue expectant management  - Antibiotics for GBS positive  4. Hep B positive:  - Avoid AROM, FSE.  Plan for Baby Vaccination and HBIG at delivery    MD June Bojorquez MD

## 2022-05-24 NOTE — PLAN OF CARE
Problem: Delayed Labor Progression (Labor)  Goal: Effective Progression to Delivery  Outcome: Ongoing, Not Progressing     Problem: Uterine Tachysystole (Labor)  Goal: Normal Uterine Contraction Pattern  Outcome: Ongoing, Not Progressing     Problem: Plan of Care - These are the overarching goals to be used throughout the patient stay.    Goal: Plan of Care Review/Shift Note  Description: The Plan of Care Review/Shift note should be completed every shift.  The Outcome Evaluation is a brief statement about your assessment that the patient is improving, declining, or no change.  This information will be displayed automatically on your shift note.  Outcome: Ongoing, Progressing  Flowsheets (Taken 5/24/2022 1453)  Plan of Care Reviewed With:   patient   spouse  Overall Patient Progress: improving     Problem: Change in Fetal Wellbeing (Labor)  Goal: Stable Fetal Wellbeing  Outcome: Ongoing, Progressing  Intervention: Promote and Monitor Fetal Wellbeing  Recent Flowsheet Documentation  Taken 5/24/2022 0730 by Beatrice Tang RN  Body Position:   tilted   left     Problem: Labor Pain (Labor)  Goal: Acceptable Pain Control  Outcome: Ongoing, Progressing     Problem: Plan of Care - These are the overarching goals to be used throughout the patient stay.    Goal: Absence of Hospital-Acquired Illness or Injury  Intervention: Prevent Skin Injury  Recent Flowsheet Documentation  Taken 5/24/2022 0730 by Beatrice Tang RN  Body Position:   tilted   left  Intervention: Prevent and Manage VTE (Venous Thromboembolism) Risk  Recent Flowsheet Documentation  Taken 5/24/2022 0730 by Beatrice Tang, RN  Activity Management:   activity adjusted per tolerance   up ad no

## 2022-05-24 NOTE — PROGRESS NOTES
Pt present to [facility] for c/o for labor that started last evening, pt denied leaking of fluids, vaginal bleeding, headache, visual changes and epigastric pain.  Pt reports good fetal movements.     Pt oriented to room and call light, EMF and toco applied.  SVE done. Vs stable.    Contacting very 3-4 minutes apart, reports as mild. Palpating mild.  EFM: category one.    Dr. Cox updated on pt's arrival,fetal tracing and contractions.

## 2022-05-24 NOTE — PROVIDER NOTIFICATION
Dr. Nya Padilla paged for status update, SVE 6/60-70%/-1 at 1835, and pt requesting IV fentanyl for increased pain.

## 2022-05-24 NOTE — PROGRESS NOTES
Dr Terrell given update on patient status, SVE unchanged and patient desires to wait and break her water.  Dr Terrell gave telephone order to start low dose pitocin at 1mu and going up by 1mu every 30 minutes. Patient is agreeable to starting pitocin at this time.    Update: Pitocin delayed start due to provider going to surgery and requesting to hold off until he is out of surgery to start.

## 2022-05-24 NOTE — PROGRESS NOTES
1634: SVE per RN, 4 cm / 50% / -2 with bloody show. Verified by preceptor, Ratna Conner RNC. Ctxs every 2-6 min with at least 60 sec rest periods; no increase in pit from 5mu at this time.

## 2022-05-24 NOTE — PROGRESS NOTES
Dr. Terrell in department, given update by RN that there was no change on SVE -  4/50/-2.  Patient reporting she's comfortable at this time.  Dr. Terrell to bedside.  Discussed plan of care options with patient.  Patient and  consent to AROM.  Pa reporting contractions stronger at this time.  Contractions palpate moderate-strong.  Category 1 tracing with many accels.  Patient declines pain medications at this time and will ask for them if she is interested. Will continue to offer labor support.  No new orders at this time.

## 2022-05-25 PROBLEM — O02.1 IUFD AT LESS THAN 20 WEEKS OF GESTATION: Status: RESOLVED | Noted: 2020-12-30 | Resolved: 2022-05-25

## 2022-05-25 PROBLEM — Z36.89 ENCOUNTER FOR TRIAGE IN PREGNANT PATIENT: Status: RESOLVED | Noted: 2022-04-09 | Resolved: 2022-05-25

## 2022-05-25 PROBLEM — Z34.90 PREGNANT: Status: RESOLVED | Noted: 2020-12-30 | Resolved: 2022-05-25

## 2022-05-25 LAB — HGB BLD-MCNC: 9.1 G/DL (ref 11.7–15.7)

## 2022-05-25 PROCEDURE — 250N000013 HC RX MED GY IP 250 OP 250 PS 637: Performed by: OBSTETRICS & GYNECOLOGY

## 2022-05-25 PROCEDURE — 120N000001 HC R&B MED SURG/OB

## 2022-05-25 PROCEDURE — 85018 HEMOGLOBIN: CPT | Performed by: OBSTETRICS & GYNECOLOGY

## 2022-05-25 PROCEDURE — 36415 COLL VENOUS BLD VENIPUNCTURE: CPT | Performed by: OBSTETRICS & GYNECOLOGY

## 2022-05-25 RX ADMIN — IBUPROFEN 800 MG: 800 TABLET ORAL at 11:46

## 2022-05-25 RX ADMIN — IBUPROFEN 800 MG: 800 TABLET ORAL at 04:21

## 2022-05-25 RX ADMIN — IBUPROFEN 800 MG: 800 TABLET ORAL at 20:33

## 2022-05-25 RX ADMIN — ACETAMINOPHEN 650 MG: 325 TABLET, FILM COATED ORAL at 14:22

## 2022-05-25 RX ADMIN — ACETAMINOPHEN 650 MG: 325 TABLET, FILM COATED ORAL at 09:02

## 2022-05-25 RX ADMIN — DOCUSATE SODIUM 100 MG: 100 CAPSULE, LIQUID FILLED ORAL at 09:03

## 2022-05-25 ASSESSMENT — ACTIVITIES OF DAILY LIVING (ADL)
ADLS_ACUITY_SCORE: 18

## 2022-05-25 NOTE — PLAN OF CARE
Problem: Pain (Postpartum Vaginal Delivery)  Goal: Acceptable Pain Control  Outcome: Ongoing, Progressing  Intervention: Prevent or Manage Pain  Recent Flowsheet Documentation  Taken 5/25/2022 1422 by Rand Guerrero RN  Pain Management Interventions: medication (see MAR)  Taking pain medication prn in order to stay on top of pain management.      Problem: Urinary Retention (Postpartum Vaginal Delivery)  Goal: Effective Urinary Elimination  Outcome: Ongoing, Progressing  Up ambulating and voiding in room independently.

## 2022-05-25 NOTE — PROCEDURES
VAGINAL DELIVERY NOTE    PRE DELIVERY DIAGNOSIS  1) Intrauterine pregnancy at 37w 3d Gestational age    POST DELIVERY DIAGNOSIS  1) Intrauterine pregnancy at 37w 3d Gestational age  2) Delivery of a female living infant.  3) Apgars of 9 at one minute, 9 at 5 minutes  4) weight unavailable at this time    PROBLEM LIST:  Patient Active Problem List   Diagnosis     Acute Pharyngitis     IUFD at less than 20 weeks of gestation     Pregnant     Encounter for triage in pregnant patient     Labor without complication     Delivery Method/Type:       PROVIDER: Vaishnavi Padilla MD     EPISIOTOMY or INCISION: None    INDICATION FOR INSTRUMENTAL DELIVERY: None    PLACENTA AND CORD:  Mechanism: Spontaneous  Description:  Complete, 3 vessel cord    Delivery QBL (mL): 50 cc    COMPLICATIONS: None    DESCRIPTION OF PROCEDURE:  36 year old  with PNC w/ Dr. Cox and pregnancy complicated by IVF pregnancy, Hepatitis B, AMA, Hx of 20 week IUFD and positive GBS. Her antepartum course was uncomplicated. Patient progressed to complete with artificial rupture of membranes and pitocin. She was prepped and draped in the usual fashion. Nurse present. Infant spontaneously delivered over an intact perineum. Placenta spontaneously delivered. Anesthesia - Local 1% lidocaine. Repair of a 2nd degree was performed with 3.0 vicryl.    Delivery was complicated by nothing. Interventions included uterine massage.    Infant:  Live, vigorous infant with apgars of 9/9. Infant was handed to mom. Infant moved all extremities.    Vaishnavi Padilla MD  (P) [phone]

## 2022-05-25 NOTE — PLAN OF CARE
Problem: Plan of Care - These are the overarching goals to be used throughout the patient stay.    Goal: Plan of Care Review/Shift Note  Description: The Plan of Care Review/Shift note should be completed every shift.  The Outcome Evaluation is a brief statement about your assessment that the patient is improving, declining, or no change.  This information will be displayed automatically on your shift note.  Outcome: Ongoing, Progressing   Patient's vital signs are stable.  She is able to ambulate to bathroom independently.  She is managing pain with ibuprofen, tucks, ice, and dermoplast.  Goal is for patient to rate her pain less than or equal to 3.

## 2022-05-25 NOTE — PLAN OF CARE
VSS. FFU, light rubra lochia, no clots expressed. Vigorous female infant delivered via  at . Baby put skin-to-skin for about 1 hour after birth. Pt encouraged to initiate breastfeeding, but opted to formula-feed at this time. Will continue to offer support. Parents want baby meds given, including Hbig.    Problem: Plan of Care - These are the overarching goals to be used throughout the patient stay.    Goal: Plan of Care Review/Shift Note  2022 by Manda Frias, RN  Outcome: Ongoing, Progressing  2022 by Manda Frias, RN  Outcome: Ongoing, Progressing  Flowsheets (Taken 2022)  Plan of Care Reviewed With:   patient   spouse  Overall Patient Progress: improving     Problem: Bleeding (Labor)  Goal: Hemostasis  Outcome: Ongoing, Progressing

## 2022-05-25 NOTE — PROGRESS NOTES
1928: pt requested 2nd fentanyl dose. SVE per RN 6-7cm / 90% / 0.    1934: Pitocin decreased to 3mu to allow for 60 sec rest period between ctxs. IV fentanyl given x2 for increased labor pain.

## 2022-05-25 NOTE — DISCHARGE SUMMARY
[facility] Discharge Summary    Gwyn Ibarra MRN# [id number]   Age: 36 year old YOB: 1985     Date of Admission:  5/24/2022  Date of Discharge::  5/25/2022  Admitting Physician:  June Cox MD  Discharge Physician:  Oksana Ramos MD     Home clinic: [facility]              Admission Diagnoses:   Encounter for triage in pregnant patient [Z36.89]  Labor without complication [O80]   IVF pregnancy          Discharge Diagnosis:   Normal spontaneous vaginal delivery  Intrauterine pregnancy at 37 weeks gestation   acute blood loss anemia         Procedures:   Procedure(s): spontaneous vaginal delivery                    Medications Prior to Admission:     Medications Prior to Admission   Medication Sig Dispense Refill Last Dose     doxylamine (UNISOM) 25 MG TABS tablet Take 12.5 mg by mouth At Bedtime As needed if unable to fall asleep.   Past Month at Unknown time     Prenatal Vit-Fe Fumarate-FA (PRENATAL MULTIVITAMIN W/IRON) 27-0.8 MG tablet Take 1 tablet by mouth daily   5/23/2022 at Unknown time             Discharge Medications:     Current Discharge Medication List      CONTINUE these medications which have NOT CHANGED    Details   doxylamine (UNISOM) 25 MG TABS tablet Take 12.5 mg by mouth At Bedtime As needed if unable to fall asleep.      Prenatal Vit-Fe Fumarate-FA (PRENATAL MULTIVITAMIN W/IRON) 27-0.8 MG tablet Take 1 tablet by mouth daily                   Consultations:   No consultations were requested during this admission          Brief History of Labor:   The patient was admitted in labor,  she progressed through normal labor curve to complete.  spontaneous vaginal delivery without complications              Hospital Course:   The patient's hospital course was unremarkable.  On discharge, her pain was well controlled. Vaginal bleeding is similar to peak menstrual flow.  Voiding without difficulty.  Ambulating well and tolerating a normal diet.  No fever.   Breastfeeding well.  Infant is stable.  No bowel movement yet.*  She was discharged on post-partum day #1.    Post-partum hemoglobin:   Hemoglobin   Date Value Ref Range Status   05/25/2022 9.1 (L) 11.7 - 15.7 g/dL Final             Discharge Instructions and Follow-Up:   Discharge diet: Regular   Discharge activity: No sex for 6 week(s)   Discharge follow-up: Follow up with Dr. Cox  in 6 weeks   Wound care:            Discharge Disposition:   Discharged to home      Attestation:  I have reviewed today's vital signs, notes, medications, labs and imaging.    Oksana Ramos MD

## 2022-05-25 NOTE — LACTATION NOTE
This note was copied from a baby's chart.  Lactation consultant to patient room per RN request as mom mentioned she might want to try breastfeeding.Mom has 4 children ages 14-19 that she did not breastfeed. Is interested in breastfeeding due to infant formula shortage, but has not breast fed, hand expressed or pumped since delivery.    Infant was just fed formula bottle. Reviewed benefits of breastfeeding, how the body begins to make milk, normal milk volumes the first week, the benefit of skin to skin prior to feeding to help get baby ready for feeding, importance of feeding baby on early hunger cues, and breastfeeding 8-12 times in 24 hours for optimal infant nutrition and hydration as well as for building an optimal milk supply.     Instructed mom to call bedside RN for assistance with breastfeeding tonight, and lactation to see her tomorrow.    Answered questions and gave encouragement.

## 2022-05-26 VITALS
HEART RATE: 76 BPM | RESPIRATION RATE: 16 BRPM | TEMPERATURE: 98.2 F | SYSTOLIC BLOOD PRESSURE: 95 MMHG | WEIGHT: 131.4 LBS | DIASTOLIC BLOOD PRESSURE: 66 MMHG | OXYGEN SATURATION: 99 % | BODY MASS INDEX: 28.35 KG/M2 | HEIGHT: 57 IN

## 2022-05-26 PROCEDURE — 250N000013 HC RX MED GY IP 250 OP 250 PS 637: Performed by: OBSTETRICS & GYNECOLOGY

## 2022-05-26 RX ADMIN — IBUPROFEN 800 MG: 800 TABLET ORAL at 18:13

## 2022-05-26 RX ADMIN — DOCUSATE SODIUM 100 MG: 100 CAPSULE, LIQUID FILLED ORAL at 10:11

## 2022-05-26 RX ADMIN — IBUPROFEN 800 MG: 800 TABLET ORAL at 05:21

## 2022-05-26 RX ADMIN — IBUPROFEN 800 MG: 800 TABLET ORAL at 12:23

## 2022-05-26 ASSESSMENT — ACTIVITIES OF DAILY LIVING (ADL)
ADLS_ACUITY_SCORE: 18

## 2022-05-26 NOTE — PLAN OF CARE
Problem: Plan of Care - These are the overarching goals to be used throughout the patient stay.    Goal: Plan of Care Review/Shift Note  Description: The Plan of Care Review/Shift note should be completed every shift.  The Outcome Evaluation is a brief statement about your assessment that the patient is improving, declining, or no change.  This information will be displayed automatically on your shift note.  Outcome: Ongoing, Progressing  Flowsheets (Taken 5/26/2022 1501)  Plan of Care Reviewed With: patient  Overall Patient Progress: improving    Pa is going to board in room due to Darlene needing phototherapy after getting dinner and her evening dose of medication.

## 2022-05-26 NOTE — PLAN OF CARE
Problem: Urinary Retention (Postpartum Vaginal Delivery)  Goal: Effective Urinary Elimination  Outcome: Ongoing, Progressing   Pa is voiding in adequate amts of blood tinged urine (400 ml's)  Problem: Pain (Postpartum Vaginal Delivery)  Goal: Acceptable Pain Control  Outcome: Ongoing, Progressing   Pa has been encouraged to keep up on her ibuprofen as she rates her pain 5/10 but usually declines meds when first asked if she needs them.states they do help.

## 2022-05-26 NOTE — LACTATION NOTE
This note was copied from a baby's chart.  This writer met with Pa to assess her breastfeeding goals.  She states RN yesterday afternoon assisted her with breastfeeding.  She has not breast fed infant since then.  She has only been bottle feeding.  This writer instructed Pa to call for lactation prn, if she wants more education or help.  Education given, if she wants to make milk and keep a good milk supply, the breasts need to be stimulated and drained at least 8 times in 24 hours.  She was encouraged to contact the outpatient lactation clinic prn.  Pa verbalizes understanding of all education given.  She denies any further questions.

## 2022-07-24 ENCOUNTER — HEALTH MAINTENANCE LETTER (OUTPATIENT)
Age: 37
End: 2022-07-24

## 2022-10-02 ENCOUNTER — HEALTH MAINTENANCE LETTER (OUTPATIENT)
Age: 37
End: 2022-10-02

## 2023-08-12 ENCOUNTER — HEALTH MAINTENANCE LETTER (OUTPATIENT)
Age: 38
End: 2023-08-12

## 2023-08-14 ENCOUNTER — HOSPITAL ENCOUNTER (OUTPATIENT)
Facility: CLINIC | Age: 38
Discharge: HOME | End: 2023-08-14
Admitting: NURSE PRACTITIONER
Payer: COMMERCIAL

## 2023-08-14 ENCOUNTER — LAB REQUISITION (OUTPATIENT)
Dept: LAB | Facility: CLINIC | Age: 38
End: 2023-08-14

## 2023-08-14 DIAGNOSIS — Z36.89 ENCOUNTER FOR OTHER SPECIFIED ANTENATAL SCREENING: ICD-10-CM

## 2023-08-14 DIAGNOSIS — B18.1 CHRONIC VIRAL HEPATITIS B WITHOUT DELTA-AGENT (H): ICD-10-CM

## 2023-08-14 LAB
ALBUMIN SERPL BCG-MCNC: 3.6 G/DL (ref 3.5–5.2)
ALP SERPL-CCNC: 64 U/L (ref 35–104)
ALT SERPL W P-5'-P-CCNC: 8 U/L (ref 0–50)
ANION GAP SERPL CALCULATED.3IONS-SCNC: 10 MMOL/L (ref 7–15)
AST SERPL W P-5'-P-CCNC: 20 U/L (ref 0–45)
BASOPHILS # BLD AUTO: 0 10E3/UL (ref 0–0.2)
BASOPHILS NFR BLD AUTO: 0 %
BILIRUB SERPL-MCNC: 0.3 MG/DL
BUN SERPL-MCNC: 11 MG/DL (ref 6–20)
CALCIUM SERPL-MCNC: 8.9 MG/DL (ref 8.6–10)
CHLORIDE SERPL-SCNC: 104 MMOL/L (ref 98–107)
CREAT SERPL-MCNC: 0.62 MG/DL (ref 0.51–0.95)
DEPRECATED HCO3 PLAS-SCNC: 23 MMOL/L (ref 22–29)
EOSINOPHIL # BLD AUTO: 0.1 10E3/UL (ref 0–0.7)
EOSINOPHIL NFR BLD AUTO: 1 %
ERYTHROCYTE [DISTWIDTH] IN BLOOD BY AUTOMATED COUNT: 13.6 % (ref 10–15)
GFR SERPL CREATININE-BSD FRML MDRD: >90 ML/MIN/1.73M2
GLUCOSE SERPL-MCNC: 76 MG/DL (ref 70–99)
HCT VFR BLD AUTO: 38.4 % (ref 35–47)
HGB BLD-MCNC: 11.9 G/DL (ref 11.7–15.7)
IMM GRANULOCYTES # BLD: 0 10E3/UL
IMM GRANULOCYTES NFR BLD: 0 %
LYMPHOCYTES # BLD AUTO: 1.3 10E3/UL (ref 0.8–5.3)
LYMPHOCYTES NFR BLD AUTO: 17 %
MCH RBC QN AUTO: 26.1 PG (ref 26.5–33)
MCHC RBC AUTO-ENTMCNC: 31 G/DL (ref 31.5–36.5)
MCV RBC AUTO: 84 FL (ref 78–100)
MONOCYTES # BLD AUTO: 0.3 10E3/UL (ref 0–1.3)
MONOCYTES NFR BLD AUTO: 4 %
NEUTROPHILS # BLD AUTO: 5.9 10E3/UL (ref 1.6–8.3)
NEUTROPHILS NFR BLD AUTO: 78 %
NRBC # BLD AUTO: 0 10E3/UL
NRBC BLD AUTO-RTO: 0 /100
PLATELET # BLD AUTO: 290 10E3/UL (ref 150–450)
POTASSIUM SERPL-SCNC: 3.5 MMOL/L (ref 3.4–5.3)
PROT SERPL-MCNC: 7 G/DL (ref 6.4–8.3)
RBC # BLD AUTO: 4.56 10E6/UL (ref 3.8–5.2)
SODIUM SERPL-SCNC: 137 MMOL/L (ref 136–145)
WBC # BLD AUTO: 7.6 10E3/UL (ref 4–11)

## 2023-08-14 PROCEDURE — 87340 HEPATITIS B SURFACE AG IA: CPT | Performed by: NURSE PRACTITIONER

## 2023-08-14 PROCEDURE — 86704 HEP B CORE ANTIBODY TOTAL: CPT | Performed by: NURSE PRACTITIONER

## 2023-08-14 PROCEDURE — 86705 HEP B CORE ANTIBODY IGM: CPT | Performed by: NURSE PRACTITIONER

## 2023-08-14 PROCEDURE — 86707 HEPATITIS BE ANTIBODY: CPT | Performed by: NURSE PRACTITIONER

## 2023-08-14 PROCEDURE — 87389 HIV-1 AG W/HIV-1&-2 AB AG IA: CPT | Performed by: NURSE PRACTITIONER

## 2023-08-14 PROCEDURE — 87350 HEPATITIS BE AG IA: CPT | Performed by: NURSE PRACTITIONER

## 2023-08-14 PROCEDURE — 86850 RBC ANTIBODY SCREEN: CPT | Performed by: NURSE PRACTITIONER

## 2023-08-14 PROCEDURE — 80053 COMPREHEN METABOLIC PANEL: CPT | Performed by: NURSE PRACTITIONER

## 2023-08-14 PROCEDURE — 86780 TREPONEMA PALLIDUM: CPT | Performed by: NURSE PRACTITIONER

## 2023-08-14 PROCEDURE — 87517 HEPATITIS B DNA QUANT: CPT | Performed by: NURSE PRACTITIONER

## 2023-08-14 PROCEDURE — 86901 BLOOD TYPING SEROLOGIC RH(D): CPT | Performed by: NURSE PRACTITIONER

## 2023-08-14 PROCEDURE — 86762 RUBELLA ANTIBODY: CPT | Performed by: NURSE PRACTITIONER

## 2023-08-14 PROCEDURE — 85025 COMPLETE CBC W/AUTO DIFF WBC: CPT | Performed by: NURSE PRACTITIONER

## 2023-08-14 PROCEDURE — 86803 HEPATITIS C AB TEST: CPT | Performed by: NURSE PRACTITIONER

## 2023-08-15 LAB
ABO/RH(D): NORMAL
ANTIBODY SCREEN: NEGATIVE
HBV CORE AB SERPL QL IA: REACTIVE
HBV CORE IGM SERPL QL IA: NONREACTIVE
HBV DNA SERPL NAA+PROBE-ACNC: 159 IU/ML
HBV DNA SERPL NAA+PROBE-LOG IU: 2.2 {LOG_IU}/ML
HBV SURFACE AG SERPL QL IA: REACTIVE
HCV AB SERPL QL IA: NONREACTIVE
HIV 1+2 AB+HIV1 P24 AG SERPL QL IA: NONREACTIVE
RUBV IGG SERPL QL IA: 2.71 INDEX
RUBV IGG SERPL QL IA: POSITIVE
SPECIMEN EXPIRATION DATE: NORMAL
T PALLIDUM AB SER QL: NONREACTIVE

## 2023-08-16 LAB
HBV E AB SERPL QL IA: POSITIVE
HBV E AG SERPL QL IA: NEGATIVE

## 2023-09-18 ENCOUNTER — TRANSFERRED RECORDS (OUTPATIENT)
Dept: HEALTH INFORMATION MANAGEMENT | Facility: CLINIC | Age: 38
End: 2023-09-18
Payer: COMMERCIAL

## 2023-09-18 ENCOUNTER — LAB REQUISITION (OUTPATIENT)
Dept: LAB | Facility: CLINIC | Age: 38
End: 2023-09-18

## 2023-09-18 ENCOUNTER — MEDICAL CORRESPONDENCE (OUTPATIENT)
Dept: HEALTH INFORMATION MANAGEMENT | Facility: CLINIC | Age: 38
End: 2023-09-18
Payer: COMMERCIAL

## 2023-09-18 DIAGNOSIS — Z36.1 ENCOUNTER FOR ANTENATAL SCREENING FOR RAISED ALPHAFETOPROTEIN LEVEL: ICD-10-CM

## 2023-09-18 DIAGNOSIS — Z11.3 ENCOUNTER FOR SCREENING FOR INFECTIONS WITH A PREDOMINANTLY SEXUAL MODE OF TRANSMISSION: ICD-10-CM

## 2023-09-18 PROCEDURE — 82105 ALPHA-FETOPROTEIN SERUM: CPT | Performed by: OBSTETRICS & GYNECOLOGY

## 2023-09-18 PROCEDURE — 87491 CHLMYD TRACH DNA AMP PROBE: CPT | Performed by: OBSTETRICS & GYNECOLOGY

## 2023-09-19 ENCOUNTER — TRANSCRIBE ORDERS (OUTPATIENT)
Dept: MATERNAL FETAL MEDICINE | Facility: HOSPITAL | Age: 38
End: 2023-09-19
Payer: COMMERCIAL

## 2023-09-19 DIAGNOSIS — O26.90 PREGNANCY RELATED CONDITION, ANTEPARTUM: Primary | ICD-10-CM

## 2023-09-19 LAB
C TRACH DNA SPEC QL PROBE+SIG AMP: NEGATIVE
N GONORRHOEA DNA SPEC QL NAA+PROBE: NEGATIVE

## 2023-09-20 LAB
# FETUSES US: NORMAL
AFP MOM SERPL: 1.18
AFP SERPL-MCNC: 52 NG/ML
AGE - REPORTED: 38.6 YR
CURRENT SMOKER: NO
FAMILY MEMBER DISEASES HX: NO
GA METHOD: NORMAL
GA: NORMAL WK
IDDM PATIENT QL: NO
INTEGRATED SCN PATIENT-IMP: NORMAL
SPECIMEN DRAWN SERPL: NORMAL

## 2023-09-29 ENCOUNTER — PRE VISIT (OUTPATIENT)
Dept: MATERNAL FETAL MEDICINE | Facility: HOSPITAL | Age: 38
End: 2023-09-29
Payer: COMMERCIAL

## 2023-10-04 ENCOUNTER — OFFICE VISIT (OUTPATIENT)
Dept: MATERNAL FETAL MEDICINE | Facility: HOSPITAL | Age: 38
End: 2023-10-04
Attending: OBSTETRICS & GYNECOLOGY
Payer: COMMERCIAL

## 2023-10-04 ENCOUNTER — TRANSFERRED RECORDS (OUTPATIENT)
Dept: HEALTH INFORMATION MANAGEMENT | Facility: CLINIC | Age: 38
End: 2023-10-04

## 2023-10-04 ENCOUNTER — OFFICE VISIT (OUTPATIENT)
Dept: MATERNAL FETAL MEDICINE | Facility: HOSPITAL | Age: 38
End: 2023-10-04
Attending: STUDENT IN AN ORGANIZED HEALTH CARE EDUCATION/TRAINING PROGRAM
Payer: COMMERCIAL

## 2023-10-04 ENCOUNTER — ANCILLARY PROCEDURE (OUTPATIENT)
Dept: ULTRASOUND IMAGING | Facility: HOSPITAL | Age: 38
End: 2023-10-04
Attending: OBSTETRICS & GYNECOLOGY
Payer: COMMERCIAL

## 2023-10-04 DIAGNOSIS — O02.1 FETAL DEMISE BEFORE 20 WEEKS WITH RETENTION OF DEAD FETUS: Primary | ICD-10-CM

## 2023-10-04 DIAGNOSIS — B18.1 HEPATITIS B, CHRONIC (H): ICD-10-CM

## 2023-10-04 DIAGNOSIS — O02.1 IUFD AT LESS THAN 20 WEEKS OF GESTATION: Primary | ICD-10-CM

## 2023-10-04 DIAGNOSIS — O26.90 PREGNANCY RELATED CONDITION, ANTEPARTUM: ICD-10-CM

## 2023-10-04 PROCEDURE — 76816 OB US FOLLOW-UP PER FETUS: CPT

## 2023-10-04 PROCEDURE — 999N000069 HC STATISTIC GENETIC COUNSELING, < 16 MIN: Performed by: GENETIC COUNSELOR, MS

## 2023-10-04 PROCEDURE — 76816 OB US FOLLOW-UP PER FETUS: CPT | Mod: 26 | Performed by: STUDENT IN AN ORGANIZED HEALTH CARE EDUCATION/TRAINING PROGRAM

## 2023-10-04 PROCEDURE — 99205 OFFICE O/P NEW HI 60 MIN: CPT | Mod: 25 | Performed by: STUDENT IN AN ORGANIZED HEALTH CARE EDUCATION/TRAINING PROGRAM

## 2023-10-04 NOTE — PROGRESS NOTES
Please see the full imaging report from the [facility] program under the imaging tab.    Please collect placental biopsy at cord insertion site as well as fetal skin for future genetic testing. We will follow up with MsAiden Bangurae at a later date to determine her final wishes regarding testing.    Magalys Mcallister MD  Maternal Fetal Medicine

## 2023-10-04 NOTE — NURSING NOTE
Gwyn Ibarra is a  at 18w4d who presents to [facility] for a comprehensive ultrasound for AMA and IVF. Pt reports starting to feel fetal movement.  Pt denies bldg/lof/change in discharge, contractions, headache, vision changes, chest pain/SOB or edema. SBAR given to Dr. Mcallister, see note in Epic.      Bharti Ford RN

## 2023-10-04 NOTE — PROGRESS NOTES
[facility]  Genetic Counseling Consult    Patient:  Gwyn Ibarra YOB: 1985   Date of Service:  10/04/23   MRN: [id number]    Pa was seen at the [facility] [place] for genetic consultation. The indication for genetic counseling is abnormal fetal ultrasound. The patient was accompanied to this visit by their partner.    The session was conducted in English.      IMPRESSION/ PLAN   Pa meet with genetic counseling briefly after her ultrasound, which unfortunately diagnosed a fetal demise at 18w4d. Given Pa's history of a fetal demise at 19w4d, genetic testing is recommended on products of conception. We discussed the options briefly but Pa was too distraught to consent for those options or start the coordination steps. We planned to talk tomorrow regarding the options including a research study.     Pa is being induced tonight, therefore, the most important step is collecting a placental and fetal tissue specimen and sending to [facility] cytogenetics for chromosome analysis (LAB [id number]). This will create a culture which could be used for further testing that can be discussed at a later time.     PREGNANCY HISTORY   /Parity:       CURRENT PREGNANCY   Current Age: 38 year old   Age at Delivery: 38 year old  ADELE: 3/2/2024, by Other Basis                                   Gestational Age: 18w4d  MEDICAL HISTORY   Pa s reported medical history is not expected to impact pregnancy management or risks to fetal development.       FAMILY HISTORY   A three-generation pedigree was not obtained today due to our focus on other topics. However, we briefly discussed the family history is negative for stillbirth, infant deaths, recurrent pregnancy loss, genetic conditions, or birth defects.        RISK ASSESSMENT FOR INHERITED CONDITIONS AND CARRIER SCREENING OPTIONS   Expanded carrier screening is  available to screen for autosomal recessive conditions and X-linked conditions in a large list of genes. Carrier screening does not test the pregnancy but gives a risk assessment for the pregnancy and future pregnancies to have the condition. Expanded carrier screening is designed to identify carrier status for conditions that are primarily childhood or adolescent onset. Expanded carrier screening does not evaluate for adult-onset conditions such as hereditary cancer syndromes, dementia/ Alzheimer's disease, or cardiovascular disease risk factors. Additionally, expanded carrier screening is not comprehensive for all known genetic diseases or inherited conditions. Carrier screening does not test for all genetic and health conditions or risk factors.     Autosomal recessive conditions happen when a mutation has been inherited from the egg and sperm and include conditions like cystic fibrosis, thalassemia, hearing loss, spinal muscular atrophy, and more. We reviewed that when both biological parents carry a harmful genetic change in a gene associated with autosomal recessive inheritance, each of their pregnancies has a 1 in 4 (25%) chance to be affected by that condition. X-linked conditions happen when a mutation has been inherited from the egg and include conditions like fragile X syndrome.With x-linked conditions, the specific risk generally depends on the chromosomal sex of the fetus, with XY individuals (generally male) being most severely affected.     Pa believes she had carrier screening at some point during the IVF process. Those results were not available for our review at the moment. In her notes, it documents [employer] which can be a small panel.     RISK ASSESSMENT FOR CHROMOSOME CONDITIONS   We explained that the risk for fetal chromosome abnormalities increases with maternal age. We discussed specific features of common chromosome abnormalities, including Down syndrome, trisomy 13, trisomy 18, and sex  chromosome trisomies.    At age 38 at midtrimester, the risk to have a baby with Down syndrome is 1 in 129.   At age 38 at midtrimester, the risk to have a baby with any chromosome abnormality is 1 in 65.     Pa had genetic screening earlier in this pregnancy. Their non-invasive prenatal test was screen negative or low risk for screened conditions     Non-invasive prenatal testing (NIPT) results  Maternal plasma cell-free DNA testing  Screens for fetal trisomy 21, trisomy 13, trisomy 18, and sex chromosome aneuploidy  First trimester ultrasound with nuchal translucency and nasal bone assessment was not performed in this pregnancy, to our knowledge.  Pa had a Panorama test earlier in pregnancy; we reviewed the results today, which are low risk.  The NIPT did include sex chromosome aneuploidies and the result was low risk. The predicted sex is XY, which is typically male.  Given the accuracy of this test, these results greatly decrease the chance for certain fetal chromosome abnormalities  We discussed the limitations of normal NIPT results  Maternal serum AFP only to screen for open neural tube defects (after 15 weeks) results were within normal limits at a 1.18 MoM, which decreased the risk of an open neural tube defect to 1 in 7600.     GENETIC TESTING OPTIONS   Genetic testing during a pregnancy includes screening and diagnostic procedures.      Screening tests are non-invasive which means no risk to the pregnancy and includes ultrasounds and blood work. The benefits and limitations of screening were reviewed. Screening tests provide a risk assessment (chance) specific to the pregnancy for certain fetal chromosome abnormalities but cannot definitively diagnose or exclude a fetal chromosome abnormality. Follow-up genetic counseling and consideration of diagnostic testing is recommended with any abnormal screening result. Diagnostic testing during a pregnancy is more certain and can test for more conditions.  However, the tests do have a risk of miscarriage that requires careful consideration. These tests can detect fetal chromosome abnormalities with greater than 99% certainty. Results can be compromised by maternal cell contamination or mosaicism and are limited by the resolution of current genetic testing technology.     There is no screening or diagnostic test that detects all forms of birth defects or intellectual disability.     Amniocentesis  Invasive diagnostic procedure done after 15 weeks gestation  The procedure collects a small sample of amniotic fluid for the purpose of chromosomal testing and/or other genetic testing  Diagnostic result; more than 99% sensitivity for fetal chromosome abnormalities  Testing for AFP in the amniotic fluid can test for open neural tube defects    It was a pleasure to be involved with Pa s care. Face-to-face time of the meeting was 15 minutes.    Edith Menjivar GC, MS, C  Board Certified and [place] Licensed Genetic Counselor   [facility]  Maternal Fetal Medicine  Office: [phone]  [facility]: [phone]   Fax: [phone]  [facility]

## 2023-10-05 PROBLEM — O02.1 FETAL DEMISE BEFORE 20 WEEKS WITH RETENTION OF DEAD FETUS: Status: ACTIVE | Noted: 2023-10-05

## 2023-10-13 ENCOUNTER — TELEPHONE (OUTPATIENT)
Dept: MATERNAL FETAL MEDICINE | Facility: CLINIC | Age: 38
End: 2023-10-13
Payer: COMMERCIAL

## 2023-10-16 NOTE — TELEPHONE ENCOUNTER
Called Pa to check-in after her recent loss and induction of labor. Placental and fetal tissue samples were taken at the time of induction and chromosome analysis is pending. There are options for more testing such as a microarray or a more detailed analysis like a whole exome sequencing. There is a study from [facility] that she would be eligible for since she has had two losses. We discussed this option and she agreed to be referred and contacted by the study.     Edith Menjivar MS, [facility]  Licensed Genetic Counselor   [facility]  Maternal Fetal Medicine  [email or web address]  Office: [phone]  Pager [phone]  [facility]: [phone]   Fax: [phone]

## 2023-11-14 ENCOUNTER — TELEPHONE (OUTPATIENT)
Dept: MATERNAL FETAL MEDICINE | Facility: HOSPITAL | Age: 38
End: 2023-11-14
Payer: COMMERCIAL

## 2023-11-14 NOTE — TELEPHONE ENCOUNTER
Left voicemail for Pa three times on three separate days in addition to MyChart messages.     The original testing plan was to do a research study or clinical exome for more information given her two 18 week losses. Per chart review the chromosome analysis came back normal. Pa had previously agreed to be contacted by the [facility] research study but they were unable to contact her.     Unfortunately, the cells in culture need to be either frozen or discarded. Pa cannot be reached to discuss next options. Per chart review, there is a BASHIR from [facility] to review the delivery details. This group, along with Pa, may want to use this sample for testing or control for preimplantation genetic testing at a later date.     The decision was made on 11/21/23 to freeze the cells and store extracted DNA to protect the possibility of Pa doing testing at a later time if desired.     Edith Menjivar MS, [facility]  Licensed Genetic Counselor   [facility]  Maternal Fetal Medicine  [email or web address]  [email or web address]  Office: [phone]  Pager [phone]  M: [phone]   Fax: [phone]

## 2023-12-08 ENCOUNTER — TELEPHONE (OUTPATIENT)
Dept: MATERNAL FETAL MEDICINE | Facility: CLINIC | Age: 38
End: 2023-12-08
Payer: COMMERCIAL

## 2023-12-08 NOTE — TELEPHONE ENCOUNTER
Called Pa to check-in regarding the saved sample from her pregnancy loss. Pa did not answer but voicemail was left encouraging her to call back. Upon review of her chart, it seems Pa had a consult with [facility] and documentation from several providers mentions the [place] research study that was reviewed with Pa. Since the [place] team (or myself) could never get in contact with Pa, the [place] research is not currently pending. A clinical exome would also be an option but Pa would need to be consented. The sample is banked and DNA extracted so testing could be done at a later time.     Edith Menjivar MS, [facility]  Licensed Genetic Counselor   [facility]  Maternal Fetal Medicine  [email or web address]  Office: [phone]  Pager [phone]  M: [phone]   Fax: [phone]

## 2024-01-10 ENCOUNTER — DOCUMENTATION ONLY (OUTPATIENT)
Dept: MATERNAL FETAL MEDICINE | Facility: CLINIC | Age: 39
End: 2024-01-10
Payer: COMMERCIAL

## 2024-01-10 NOTE — TELEPHONE ENCOUNTER
E-mail on 12/12/23. Received no response.     Alan Pascal,    I have been trying to get a hold of you through telephone calls. Can you please call me at [phone]. Thank you.     Edith Menjivar MS, [facility]  Licensed Genetic Counselor   [facility]  Maternal Fetal Medicine  [email or web address]  Office: [phone]  Pager [phone]  M: [phone]  Fax: [phone]  Gender pronouns: she/her  Employed by [employer]   ?

## 2024-01-12 ENCOUNTER — TELEPHONE (OUTPATIENT)
Dept: MATERNAL FETAL MEDICINE | Facility: CLINIC | Age: 39
End: 2024-01-12
Payer: COMMERCIAL

## 2024-01-12 NOTE — TELEPHONE ENCOUNTER
Called Pa and left voicemail encouraging her to call back to discuss testing options for her most recent pregnancy loss.     Edith Menjivar MS, [facility]  Licensed Genetic Counselor   [facility]  Maternal Fetal Medicine  [email or web address]  Office: [phone]  Pager [phone]  [facility]: [phone]   Fax: [phone]

## 2024-05-31 ENCOUNTER — TRANSFERRED RECORDS (OUTPATIENT)
Dept: HEALTH INFORMATION MANAGEMENT | Facility: CLINIC | Age: 39
End: 2024-05-31

## 2024-06-03 ENCOUNTER — LAB REQUISITION (OUTPATIENT)
Dept: LAB | Facility: CLINIC | Age: 39
End: 2024-06-03

## 2024-06-03 ENCOUNTER — MEDICAL CORRESPONDENCE (OUTPATIENT)
Dept: HEALTH INFORMATION MANAGEMENT | Facility: CLINIC | Age: 39
End: 2024-06-03
Payer: COMMERCIAL

## 2024-06-03 ENCOUNTER — TRANSFERRED RECORDS (OUTPATIENT)
Dept: HEALTH INFORMATION MANAGEMENT | Facility: CLINIC | Age: 39
End: 2024-06-03
Payer: COMMERCIAL

## 2024-06-03 DIAGNOSIS — E03.9 HYPOTHYROIDISM, UNSPECIFIED: ICD-10-CM

## 2024-06-03 DIAGNOSIS — O09.529 SUPERVISION OF ELDERLY MULTIGRAVIDA, UNSPECIFIED TRIMESTER: ICD-10-CM

## 2024-06-03 LAB
BASOPHILS # BLD AUTO: 0 10E3/UL (ref 0–0.2)
BASOPHILS NFR BLD AUTO: 0 %
EOSINOPHIL # BLD AUTO: 0 10E3/UL (ref 0–0.7)
EOSINOPHIL NFR BLD AUTO: 1 %
ERYTHROCYTE [DISTWIDTH] IN BLOOD BY AUTOMATED COUNT: 14.1 % (ref 10–15)
HBA1C MFR BLD: 5.7 %
HCT VFR BLD AUTO: 38.6 % (ref 35–47)
HGB BLD-MCNC: 12.8 G/DL (ref 11.7–15.7)
HIV 1+2 AB+HIV1 P24 AG SERPL QL IA: NONREACTIVE
IMM GRANULOCYTES # BLD: 0 10E3/UL
IMM GRANULOCYTES NFR BLD: 0 %
LYMPHOCYTES # BLD AUTO: 1.4 10E3/UL (ref 0.8–5.3)
LYMPHOCYTES NFR BLD AUTO: 23 %
MCH RBC QN AUTO: 27.5 PG (ref 26.5–33)
MCHC RBC AUTO-ENTMCNC: 33.2 G/DL (ref 31.5–36.5)
MCV RBC AUTO: 83 FL (ref 78–100)
MONOCYTES # BLD AUTO: 0.3 10E3/UL (ref 0–1.3)
MONOCYTES NFR BLD AUTO: 5 %
NEUTROPHILS # BLD AUTO: 4.2 10E3/UL (ref 1.6–8.3)
NEUTROPHILS NFR BLD AUTO: 71 %
NRBC # BLD AUTO: 0 10E3/UL
NRBC BLD AUTO-RTO: 0 /100
PLATELET # BLD AUTO: 244 10E3/UL (ref 150–450)
RBC # BLD AUTO: 4.66 10E6/UL (ref 3.8–5.2)
WBC # BLD AUTO: 6 10E3/UL (ref 4–11)

## 2024-06-03 PROCEDURE — 86592 SYPHILIS TEST NON-TREP QUAL: CPT | Performed by: NURSE PRACTITIONER

## 2024-06-03 PROCEDURE — 83036 HEMOGLOBIN GLYCOSYLATED A1C: CPT | Performed by: NURSE PRACTITIONER

## 2024-06-03 PROCEDURE — 86803 HEPATITIS C AB TEST: CPT | Performed by: NURSE PRACTITIONER

## 2024-06-03 PROCEDURE — 87086 URINE CULTURE/COLONY COUNT: CPT | Performed by: NURSE PRACTITIONER

## 2024-06-03 PROCEDURE — 86900 BLOOD TYPING SEROLOGIC ABO: CPT | Performed by: NURSE PRACTITIONER

## 2024-06-03 PROCEDURE — 85025 COMPLETE CBC W/AUTO DIFF WBC: CPT | Performed by: NURSE PRACTITIONER

## 2024-06-03 PROCEDURE — 87341 HEP B SURFACE AG NEUTRLZJ IA: CPT | Performed by: NURSE PRACTITIONER

## 2024-06-03 PROCEDURE — 84481 FREE ASSAY (FT-3): CPT | Performed by: NURSE PRACTITIONER

## 2024-06-03 PROCEDURE — 87389 HIV-1 AG W/HIV-1&-2 AB AG IA: CPT | Performed by: NURSE PRACTITIONER

## 2024-06-03 PROCEDURE — 86762 RUBELLA ANTIBODY: CPT | Performed by: NURSE PRACTITIONER

## 2024-06-03 PROCEDURE — 84439 ASSAY OF FREE THYROXINE: CPT | Performed by: NURSE PRACTITIONER

## 2024-06-03 PROCEDURE — 84443 ASSAY THYROID STIM HORMONE: CPT | Performed by: NURSE PRACTITIONER

## 2024-06-04 LAB
ABO/RH(D): NORMAL
ANTIBODY SCREEN: NEGATIVE
HCV AB SERPL QL IA: NONREACTIVE
RPR SER QL: NONREACTIVE
RUBV IGG SERPL QL IA: 2.07 INDEX
RUBV IGG SERPL QL IA: POSITIVE
SPECIMEN EXPIRATION DATE: NORMAL
T3FREE SERPL-MCNC: 2.8 PG/ML (ref 2–4.4)
T4 FREE SERPL-MCNC: 1.27 NG/DL (ref 0.9–1.7)
TSH SERPL DL<=0.005 MIU/L-ACNC: 0.23 UIU/ML (ref 0.3–4.2)

## 2024-06-05 ENCOUNTER — TRANSCRIBE ORDERS (OUTPATIENT)
Dept: MATERNAL FETAL MEDICINE | Facility: HOSPITAL | Age: 39
End: 2024-06-05
Payer: COMMERCIAL

## 2024-06-05 ENCOUNTER — MEDICAL CORRESPONDENCE (OUTPATIENT)
Dept: HEALTH INFORMATION MANAGEMENT | Facility: CLINIC | Age: 39
End: 2024-06-05
Payer: COMMERCIAL

## 2024-06-05 DIAGNOSIS — O26.90 PREGNANCY RELATED CONDITION, ANTEPARTUM: Primary | ICD-10-CM

## 2024-06-05 LAB
BACTERIA UR CULT: NO GROWTH
HBV SURFACE AG SERPL QL IA: REACTIVE

## 2024-06-24 ENCOUNTER — LAB REQUISITION (OUTPATIENT)
Dept: LAB | Facility: CLINIC | Age: 39
End: 2024-06-24
Payer: COMMERCIAL

## 2024-06-24 DIAGNOSIS — Z12.4 ENCOUNTER FOR SCREENING FOR MALIGNANT NEOPLASM OF CERVIX: ICD-10-CM

## 2024-06-24 DIAGNOSIS — Z34.82 ENCOUNTER FOR SUPERVISION OF OTHER NORMAL PREGNANCY, SECOND TRIMESTER: ICD-10-CM

## 2024-06-24 DIAGNOSIS — B18.1 CHRONIC VIRAL HEPATITIS B WITHOUT DELTA-AGENT (H): ICD-10-CM

## 2024-06-24 DIAGNOSIS — E03.9 HYPOTHYROIDISM, UNSPECIFIED: ICD-10-CM

## 2024-06-24 PROCEDURE — 87624 HPV HI-RISK TYP POOLED RSLT: CPT | Performed by: OBSTETRICS & GYNECOLOGY

## 2024-06-24 PROCEDURE — 87491 CHLMYD TRACH DNA AMP PROBE: CPT | Performed by: OBSTETRICS & GYNECOLOGY

## 2024-06-24 PROCEDURE — G0145 SCR C/V CYTO,THINLAYER,RESCR: HCPCS | Performed by: OBSTETRICS & GYNECOLOGY

## 2024-06-24 PROCEDURE — 87517 HEPATITIS B DNA QUANT: CPT | Performed by: OBSTETRICS & GYNECOLOGY

## 2024-06-24 PROCEDURE — 84439 ASSAY OF FREE THYROXINE: CPT | Performed by: OBSTETRICS & GYNECOLOGY

## 2024-06-24 PROCEDURE — 84481 FREE ASSAY (FT-3): CPT | Performed by: OBSTETRICS & GYNECOLOGY

## 2024-06-24 PROCEDURE — 84443 ASSAY THYROID STIM HORMONE: CPT | Performed by: OBSTETRICS & GYNECOLOGY

## 2024-06-24 PROCEDURE — 80053 COMPREHEN METABOLIC PANEL: CPT | Performed by: OBSTETRICS & GYNECOLOGY

## 2024-06-25 LAB
ALBUMIN SERPL BCG-MCNC: 3.8 G/DL (ref 3.5–5.2)
ALP SERPL-CCNC: 74 U/L (ref 40–150)
ALT SERPL W P-5'-P-CCNC: 11 U/L (ref 0–50)
ANION GAP SERPL CALCULATED.3IONS-SCNC: 10 MMOL/L (ref 7–15)
AST SERPL W P-5'-P-CCNC: 16 U/L (ref 0–45)
BILIRUB SERPL-MCNC: 0.5 MG/DL
BUN SERPL-MCNC: 7.6 MG/DL (ref 6–20)
C TRACH DNA SPEC QL PROBE+SIG AMP: NEGATIVE
CALCIUM SERPL-MCNC: 8.9 MG/DL (ref 8.6–10)
CHLORIDE SERPL-SCNC: 104 MMOL/L (ref 98–107)
CREAT SERPL-MCNC: 0.52 MG/DL (ref 0.51–0.95)
DEPRECATED HCO3 PLAS-SCNC: 23 MMOL/L (ref 22–29)
EGFRCR SERPLBLD CKD-EPI 2021: >90 ML/MIN/1.73M2
GLUCOSE SERPL-MCNC: 75 MG/DL (ref 70–99)
HPV HR 12 DNA CVX QL NAA+PROBE: NEGATIVE
HPV16 DNA CVX QL NAA+PROBE: NEGATIVE
HPV18 DNA CVX QL NAA+PROBE: NEGATIVE
HUMAN PAPILLOMA VIRUS FINAL DIAGNOSIS: NORMAL
N GONORRHOEA DNA SPEC QL NAA+PROBE: NEGATIVE
POTASSIUM SERPL-SCNC: 4.6 MMOL/L (ref 3.4–5.3)
PROT SERPL-MCNC: 7.1 G/DL (ref 6.4–8.3)
SODIUM SERPL-SCNC: 137 MMOL/L (ref 135–145)
T3FREE SERPL-MCNC: 2.6 PG/ML (ref 2–4.4)
T4 FREE SERPL-MCNC: 0.95 NG/DL (ref 0.9–1.7)
TSH SERPL DL<=0.005 MIU/L-ACNC: 0.87 UIU/ML (ref 0.3–4.2)

## 2024-06-26 DIAGNOSIS — O26.90 PREGNANCY RELATED CONDITION: Primary | ICD-10-CM

## 2024-06-26 LAB
HBV DNA SERPL NAA+PROBE-ACNC: 376 IU/ML
HBV DNA SERPL NAA+PROBE-LOG IU: 2.6 {LOG_IU}/ML

## 2024-06-27 LAB
BKR LAB AP GYN ADEQUACY: NORMAL
BKR LAB AP GYN INTERPRETATION: NORMAL
PATH REPORT.COMMENTS IMP SPEC: NORMAL
PATH REPORT.COMMENTS IMP SPEC: NORMAL

## 2024-07-11 ENCOUNTER — LAB REQUISITION (OUTPATIENT)
Dept: LAB | Facility: CLINIC | Age: 39
End: 2024-07-11
Payer: COMMERCIAL

## 2024-07-11 ENCOUNTER — TRANSFERRED RECORDS (OUTPATIENT)
Dept: HEALTH INFORMATION MANAGEMENT | Facility: CLINIC | Age: 39
End: 2024-07-11

## 2024-07-11 DIAGNOSIS — Z36.1 ENCOUNTER FOR ANTENATAL SCREENING FOR RAISED ALPHAFETOPROTEIN LEVEL: ICD-10-CM

## 2024-07-11 PROCEDURE — 82105 ALPHA-FETOPROTEIN SERUM: CPT | Performed by: PHYSICIAN ASSISTANT

## 2024-07-13 LAB
# FETUSES US: NORMAL
AFP MOM SERPL: 0.95
AFP SERPL-MCNC: 40 NG/ML
AGE - REPORTED: 39.4 YR
CURRENT SMOKER: NO
FAMILY MEMBER DISEASES HX: NORMAL
GA METHOD: NORMAL
GA: NORMAL WK
IDDM PATIENT QL: NO
INTEGRATED SCN PATIENT-IMP: NORMAL
SPECIMEN DRAWN SERPL: NORMAL

## 2024-07-29 ENCOUNTER — PRE VISIT (OUTPATIENT)
Dept: MATERNAL FETAL MEDICINE | Facility: CLINIC | Age: 39
End: 2024-07-29
Payer: COMMERCIAL

## 2024-07-29 PROBLEM — O09.819 PREGNANCY RESULTING FROM IN-VITRO FERTILIZATION: Status: ACTIVE | Noted: 2020-12-30

## 2024-07-29 PROBLEM — Z87.59 HISTORY OF INTRAUTERINE DEATH: Status: ACTIVE | Noted: 2024-07-29

## 2024-07-29 PROBLEM — O09.529 MULTIGRAVIDA OF ADVANCED MATERNAL AGE: Status: ACTIVE | Noted: 2024-07-29

## 2024-07-29 PROBLEM — E03.9 HYPOTHYROIDISM: Status: ACTIVE | Noted: 2024-07-29

## 2024-07-29 PROBLEM — R73.03 PREDIABETES: Status: ACTIVE | Noted: 2024-07-29

## 2024-07-29 PROBLEM — B18.1 HEPATITIS B CARRIER (H): Status: ACTIVE | Noted: 2024-07-29

## 2024-07-29 PROBLEM — Z87.59 HISTORY OF ECTOPIC PREGNANCY: Status: ACTIVE | Noted: 2024-07-29

## 2024-07-29 RX ORDER — LEVOTHYROXINE SODIUM 25 UG/1
25 TABLET ORAL DAILY
COMMUNITY
Start: 2024-06-05

## 2024-08-04 NOTE — PROGRESS NOTES
[facility]  [address] [place] [address] S [address], [place] [address]  Main: [phone], Fax: [phone]       Referring Provider:  Harinder Pascal OSCAR Ibarra is a 38 year old  at 19w5d by IVF (confirmed w/8w US) consistent with 19w5d US here for MFM consultation regarding 2 prior 18 and 19 week losses.     Her first 3 children were by a prior partner. She then had 2 ectopic pregnancies with bilateral salpingectomy completed for resection. She has undergone IVF for pregnancies G6-G9 (present). She states she has undergone normal carrier screening through her [facility] provider, Dr. Espinoza.     Her G6 pregnancy resulted in a 19w4d IUFD on 20 and was noted to have hydropic changes- not noted on prior U/S several weeks prior. Infant was a 15w sized 46 XY male, no gross abn. Placenta with mild acute funisitis, Velamentous 3VC, <5% placental infarct. She had APLAS and parvo testing that was negative. Autopsy was not completed.     Her G7 IVF pregnancy with the same partner (Maulik) resulted in a 37w3d  of a live female infant.     Her G8 IVF pregnancy resulted in a 18w4d IUFD on 10/4/23. This pregnancy Stacey had LR NIPT. Upon delivery there were extensive hydropic changes noted. The most recent prior US was at 12w before this demise. Karyotype was normal, 46XY. Placenta w/parenchymal hemorrhage, acute/chronic deciduitis, 3VC, membranes with mild chronic inflammation. Placental culture: GBS + and E.coli. Whole Genome Sequencing was discussed through [facility], however this was stated to take 8-12 months and the sample was not sent. She had APLAS and parvo testing that was negative. She was negative for Hep Be antigen at time of this delivery. Autopsy was not completed.     G9: Current. IVF. LR NIPT of female.     Her pregnancy is also complicated by:   - Chronic hepatitis B carrier, most recent labs + quant of 2.6 log/ 376 international unit(s)/mL.   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 Pizano from [facility].     OB History    Para Term  AB Living   9 4 4 0 4 4   SAB IAB Ectopic Multiple Live Births   0 0 2 0 4      # Outcome Date GA Lbr Dony/2nd Weight Sex Type Anes PTL Lv   9 Current            8 AB 10/05/23 18w5d  0.249 kg (8.8 oz) M Vag-Spont IV  FD      Name: BINH,PENDING BABY PA FD      Apgar1: 0  Apgar5: 0   7 Term 22 37w3d / 00:26 2.91 kg (6 lb 6.7 oz) F Vag-Spont IV N SEKOU      Name: BINH,FEMALE-PA      Apgar1: 9  Apgar5: 9   6 AB 20 19w4d  0.1 kg (3.5 oz) M  None  FD      Name: BINH,BABY-PA FD      Apgar1: 0  Apgar5: 0   5 Ectopic      ECTOPIC         Birth Comments: left salpingectomy   4 Ectopic      ECTOPIC         Birth Comments: right salpingectomy   3 Term 10/29/07   2.92 kg (6 lb 7 oz) M Vag-Spont   SEKOU   2 Term 01/10/05   2.892 kg (6 lb 6 oz) F Vag-Spont   SEKOU   1 Term 03   2.892 kg (6 lb 6 oz) M Vag-Spont   SEKOU     Detailed pregnancy history:     G1-G3: 3 uncomp  [id number].   G4-G5: Tubal ectopics with BS   G6: IVF, 19w4d IUFD on 20- w/hydropic changes- not noted on prior U/S. 15w sized XY male, no gross abn. Mild acute funisitis, Velamentous 3VC, <5% placental infarct.   G7: IVF, 37w3d  on 22   G8: IVF, 18w4d IUFD on 10/4/23-w/LR NIPT, extensive hydropic changes- most recent prior US was at 12w & nml. 46XY (klinefelters?) Placenta w/parenchymal hemorrhage, acute/chronic deciduitis, 3VC, membranes with mild chronic inflammation. Plac cx: GBS + and E.coli. WGS testing was discussed, but not sent.   G9: IVF, present       Gynecologic History   - Denies any history of abnormal pap smears  - Denies prior cervical surgery or procedures  - Denies any history of frequent UTIs, vaginal infections, or STIs    Past Medical History     Past Medical  "History:   Diagnosis Date    Liver disease     Miscarriage     Single liveborn infant delivered vaginally 2022    Thyroid disease        Past Surgical History     Past Surgical History:   Procedure Laterality Date    LAPAROSCOPY FOR ECTOPIC PREGNANCY Right 10/27/2010    LAPAROSCOPY FOR ECTOPIC PREGNANCY Left 2012       Medication List     Prior to Admission medications    Medication Sig Last Dose Taking? Auth Provider Long Term End Date   doxylamine (UNISOM) 25 MG TABS tablet Take 12.5 mg by mouth At Bedtime As needed if unable to fall asleep.   Reported, Patient     levothyroxine (SYNTHROID/LEVOTHROID) 25 MCG tablet Take 25 mcg by mouth daily   Reported, Patient Yes    Prenatal Vit-Fe Fumarate-FA (PRENATAL MULTIVITAMIN W/IRON) 27-0.8 MG tablet Take 1 tablet by mouth daily   Reported, Patient         Allergies   Patient has no known allergies.    Social History   Denies use of alcohol, drugs or smoking.    Family History   Please see genetic counseling note for detailed 3-generation family history.  Family history significant for maternal sister with IUFD x2 of genetically male fetuses.     Specifically, denies family history of motor/intellectual impairment, genetic or chromosome abnormalities or congenital anomalies.      Review of Systems   10-point ROS negative except as in HPI.    Physical Exam   BP 96/60 (BP Location: Right arm, Patient Position: Sitting, Cuff Size: Adult Regular)   Pulse 62   Resp 16   SpO2 97%     Gen: NAD  CV: RRR  Resp: CTAB  Abd: Gravid, non-tender  Ext: No edema    Labs   Normal LR NIPT of female fetus     Ultrasound   See today's ultrasound report under the \"imaging\" tab.    Other Pertinent Evaluation     N/A.        Assessment/Counseling     Pa OSCAR Ibarra is a 38 year old  at 19w3d by IVF consistent with 19w5d US here for MFM consultation regarding 2 prior second trimester losses.    Recommendations     Genetic screening  - Met with genetic counseling (see separate " note for full details)  - Had LR NIPT female for genetic screening    Medications  - Synthroid for hypothyroidism, continue. Re-check TSH w/reflex to T4 every trimester.     Laboratory evaluation  - Requesting records of carrier screening from [facility] provider    Maternal antepartum management  -# Advanced Maternal Age  The risk of fetal aneuploidy increases with age. Her age specific risk is 1:148 for Trisomy 21 and 1:104 for any chromosomal abnormality.     Patients of advanced age are also at increased risks of pregnancy complications, including miscarriage, preeclampsia, abnormalities with placentation, stillbirth, and  delivery.  These risks increase with increasing maternal age and comorbidities.   Fetal cell free DNA from maternal serum - completed   Amniocentesis    Recommendations:  Targeted anatomical ultrasound at 18-20 weeks.    Consider induction of labor at 39-40 weeks, but no later than 41 weeks.  If chronic medication is not being used and serial growth is not implemented for other comorbidities, then single growth assessment in the third trimester.  Consider low-dose aspirin (usually 81 mg daily) started in the early second trimester for preeclampsia prevention if additional risk-factors exist.        Hepatitis B    Hepatitis B is virus which is typically acquired through direct contact with bodily fluids of a carrier, and may also be vertically transmitted between the mother and the child during childbirth or transplacentally (5%).  Chronic carriers with persistent viral load have increased risks of longer term sequelae such as liver damage, cirrhosis and hepatocellular carcinoma.  Although vertical transmission of Hepatitis B may be as high as 85-95% in women with primary infection in pregnancy, chronic carriers have a lower transmission rate dependent upon whether the EAg/EAb are negative (31%) or EAg(-) /EAb (+) (10%).  Discuss patient s specific risks based on serologies. It is important to  assess for co-infection with other viruses including hepatitis A, hepatitis C, and HIV.   immunoprophylaxis reduces the risk of HB transmission by 85-95%.  Lamivudine and tenofovir are being explored to decrease transmission, especially in patients co-infected with HIV and HBV, and those with HBEAg.      Recommendations:  Continued or establish care with gastroenterology.  Obtain HB DNA level at the beginning of pregnancy and consult gastroenterology for possible antiviral treatment if viral load is high.  If initial viral load is low, repeat testing at 26-28 weeks and consider initiating antiviral medication for reduction of maternal to child transmission  Check liver function tests now for baseline and every trimester or as indicated.  Send laboratory tests to evaluate for co-infection with hepatitis A, hepatitis C, and HIV.  If non-immune to hepatitis A, recommend immunization.  Avoid internal monitoring or invasive procedures intrapartum.  Avoid operative vaginal delivery if possible.   delivery reserved for routine obstetric conditions.  Evaluation of  by pediatricians after delivery.  Hepatitis B IgG and vaccination should be provided to the  within 12 hours of delivery.   Breastfeeding is not contraindicated if the  receives both.    History of Fetal Demise    The most common causes of fetal death in developed countries are congenital or karyotypic anomalies, placental problems associated with growth restriction, and maternal medical diseases such as infection, diabetes mellitus, or hypertensive disorders. Many cases of fetal death, especially at term, are attributed to umbilical cord accidents. In approximately 25% of cases, however, the cause of fetal death cannot be explained.  The majority of women experiencing a fetal demise do not have any risk factors.   In Ms. Ibarra's case she is a chronic Hepatitis B carrier and has had 2 prior demises that are not fully explained.      If a specific cause of a previous fetal death is identified, the recurrence risk can be better estimated.  However, if the cause of fetal death in a low-risk individual was not discovered, the risk of recurrence is estimated to be 7.8 to 10.5 per 1000 births.  The subsequent pregnancy is also at risk for abruption,  birth, and fetal growth restriction.       Recommendations:  Evaluation for possible antiphospholipid antibody syndrome with anticardiolipin antibody (IgG and IgM) titer, lupus anticoagulant, and beta-2 microglobulin (IgG and IgM)- already completed at the time of each loss and negative   Low dose aspirin started at 12-16 weeks if there are risk factors for preeclampsia  MSAFP at 15-20 weeks to assess placental function- completed   Comprehensive anatomy ultrasound at 18-20 weeks with MFM- completed today   Serial ultrasounds every 4 weeks to assess growth may be used to assess placental function.  If growth is lagging or interval growth is suboptimal ultrasounds should be done more frequently  Daily fetal movement counts after 28 weeks  gestation   Frequent visits, documentation of fetal heart tones and fetal well-being and lots of positive reinforcement are invaluable.   Delivery at 39 weeks    # In Vitro Fertilization Pregnancy  - The use of IVF, with and without ICSI, has been associated with an increased risk for congenital anomalies (specifically cardiac), hypertensive disorders, preeclampsia, placental abruption, placenta previa and other placental abnormalities, small for gestational age and  delivery.   - ICSI has also been associated with imprinting disorders, such as Al-Wiedemann and Angelman's Syndromes, however there are no studies confirming these concerns as a causal relationship.   - While the association between IVF and these adverse outcomes raise some concern, it is important to note that the literature is contradictory on this subject and the chances of having  a healthy child conceived with ART are overall extremely high.      Recommendations:  We recommend offering routine aneuploidy screening regardless of whether PGS is completed- NIPT completed at LR female   Comprehensive anatomical survey at 18-20 weeks with careful evaluation of the placenta- completed today   Fetal echocardiogram at 22 weeks  Fetal growth ultrasound at 32 weeks gestation  Initiation of weekly  testing starting at 36 weeks gestation  Close monitoring for preeclampsia  Fetal testing and timing of delivery per routine obstetric guidelines  Consider low dose aspirin for preeclampsia prophylaxis      Ultrasound surveillance  - Anatomy scan today   - Growth U/S q4w as long as no FGR is idenitified   - ANT starting at 36 weeks with weekly BPP     Timing and mode of delivery  - Delivery at [id number]  - Likely candidate for IOL/    Postpartum management   - Normal postpartum care       The patient was seen and evaluated with Dr. Rell Carrasco.    At the end of our discussion, Ms. Ibarra indicated that her questions were answered and she seemed satisfied with our discussion.  Thank you for allowing us to participate in the care of your patient. Please do not hesitate to contact us if you have further questions regarding the management of your patient.     Bria Waite MD   [facility] Fellow

## 2024-08-06 ENCOUNTER — OFFICE VISIT (OUTPATIENT)
Dept: MATERNAL FETAL MEDICINE | Facility: CLINIC | Age: 39
End: 2024-08-06
Attending: OBSTETRICS & GYNECOLOGY
Payer: COMMERCIAL

## 2024-08-06 ENCOUNTER — HOSPITAL ENCOUNTER (OUTPATIENT)
Dept: ULTRASOUND IMAGING | Facility: CLINIC | Age: 39
Discharge: HOME | End: 2024-08-06
Attending: OBSTETRICS & GYNECOLOGY
Payer: COMMERCIAL

## 2024-08-06 VITALS
HEART RATE: 62 BPM | OXYGEN SATURATION: 97 % | SYSTOLIC BLOOD PRESSURE: 96 MMHG | RESPIRATION RATE: 16 BRPM | DIASTOLIC BLOOD PRESSURE: 60 MMHG

## 2024-08-06 DIAGNOSIS — O26.90 PREGNANCY RELATED CONDITION: ICD-10-CM

## 2024-08-06 DIAGNOSIS — O26.92 PREGNANCY RELATED CONDITION IN SECOND TRIMESTER: Primary | ICD-10-CM

## 2024-08-06 DIAGNOSIS — O09.522 MULTIGRAVIDA OF ADVANCED MATERNAL AGE IN SECOND TRIMESTER: ICD-10-CM

## 2024-08-06 DIAGNOSIS — Z36.9 ENCOUNTER FOR ANTENATAL SCREENING: ICD-10-CM

## 2024-08-06 DIAGNOSIS — Z87.59 HISTORY OF IUFD: ICD-10-CM

## 2024-08-06 DIAGNOSIS — Z36.3 ENCOUNTER FOR ROUTINE SCREENING FOR FETAL MALFORMATION USING ULTRASOUND: ICD-10-CM

## 2024-08-06 DIAGNOSIS — O09.292 HISTORY OF PREGNANCY LOSS IN PRIOR PREGNANCY, CURRENTLY PREGNANT IN SECOND TRIMESTER: Primary | ICD-10-CM

## 2024-08-06 DIAGNOSIS — Z31.5 ENCOUNTER FOR PROCREATIVE GENETIC COUNSELING: ICD-10-CM

## 2024-08-06 DIAGNOSIS — O09.812 PREGNANCY RESULTING FROM IN VITRO FERTILIZATION IN SECOND TRIMESTER: ICD-10-CM

## 2024-08-06 PROCEDURE — 76811 OB US DETAILED SNGL FETUS: CPT | Mod: 26 | Performed by: OBSTETRICS & GYNECOLOGY

## 2024-08-06 PROCEDURE — G0463 HOSPITAL OUTPT CLINIC VISIT: HCPCS | Mod: 25 | Performed by: OBSTETRICS & GYNECOLOGY

## 2024-08-06 PROCEDURE — 76811 OB US DETAILED SNGL FETUS: CPT

## 2024-08-06 PROCEDURE — 96040 HC GENETIC COUNSELING, EACH 30 MINUTES: CPT

## 2024-08-06 PROCEDURE — 99215 OFFICE O/P EST HI 40 MIN: CPT | Mod: 25 | Performed by: OBSTETRICS & GYNECOLOGY

## 2024-08-06 NOTE — NURSING NOTE
Pa accompanied by Maulik, is here for MFM GC/L2/consult at 19w5d related to history of Hx 2nd trimester IUFD x2, IVF pregnancy, Hypothyroidism, AMA, Chronic Hep B. Patient reports active FM. Denies vaginal bleeding, leaking of fluid, or contractions. Medications and allergies reviewed. Dr. Carrasco and Dr. Aviles met with patient to discuss plan, see consult note. BASHIR signed and sent to [facility] for records. Patient discharged stable and ambulatory.

## 2024-08-06 NOTE — PROGRESS NOTES
[facility] [place]  Genetic Counseling Consult    Patient:  Gwyn Ibarra  Preferred Name: Pa YOB: 1985   Date of Service:  8/06/24   MRN: [id number]    Pa was seen at the [facility] for genetic consultation. The indication for genetic counseling is advanced maternal age and history of 2 second trimester fetal demises . The patient was accompanied to this visit by their partner.    The session was conducted in English.      IMPRESSION/ PLAN   1. Pa had genetic screening earlier in this pregnancy. Their non-invasive prenatal test was screen negative or low risk for screened conditions     2. During today's [facility] visit, Pa had a genetic counseling session only. Screening and diagnostic testing was discussed and declined.     3. Since the patient chose aneuploidy screening via NIPT, quad screen is NOT recommended in the second trimester. If the patient desires screening for open neural tube defects, maternal serum AFP only is recommended, ideally between 16-18 weeks gestation.    4. Per the medical record, the patient had carrier screening in the past. However, records were not available for review today. The patient is uncertain of how many conditions she may have had screening for. We discussed that if the patient is able to provide records, I could review which conditions were included. Additional screening may be available. Current panels can screen for hundreds of conditions. While previous losses could be unrelated to genetic syndromes, Pa and her sister's histories of stillbirth (both are reported to have had two male demises each at about mid pregnancy) could elevate the concern for an X-linked condition. While Pa's previous losses had normal karyotype results per the EMR, single gene testing was not performed. Pa is welcome to contact me in the future if she wishes to pursue further carrier screening.    5. Pa had discussed  the option of single gene testing for her previous pregnancy loss with genetic counselor Edith Menjivar [facility], through a study at [place]. However, while offered, this study was never preformed for Pa. There may not be any preserved DNA from her previous loss available, but we could check with our labs. Today, Pa declined checking with cytogenetics to see if there is any preserved DNA available from her previous loss.     6. Pa had a level II comprehensive anatomy ultrasound today. Please see the ultrasound report for further details.    7. Pa had a MFM consultation today. Please see the consult note for more further details.     PREGNANCY HISTORY   /Parity:   OB History    Para Term  AB Living   9 4 4 0 4 4   SAB IAB Ectopic Multiple Live Births   0 0 2 0 4      # Outcome Date GA Lbr Dony/2nd Weight Sex Type Anes PTL Lv   9 Current            8 AB 10/05/23 18w5d  0.249 kg (8.8 oz) M Vag-Spont IV  FD      Name: BINH,PENDING BABY PA FD      Apgar1: 0  Apgar5: 0   7 Term 22 37w3d / 00:26 2.91 kg (6 lb 6.7 oz) F Vag-Spont IV N SEKOU      Name: BINH,FEMALE-PA      Apgar1: 9  Apgar5: 9   6 AB 20 19w4d  0.1 kg (3.5 oz) M  None  FD      Name: BINHBABY-PA FD      Apgar1: 0  Apgar5: 0   5 Ectopic      ECTOPIC         Birth Comments: left salpingectomy   4 Ectopic      ECTOPIC         Birth Comments: right salpingectomy   3 Term 10/29/07   2.92 kg (6 lb 7 oz) M Vag-Spont   SEKOU   2 Term 01/10/05   2.892 kg (6 lb 6 oz) F Vag-Spont   SEKOU   1 Term 03   2.892 kg (6 lb 6 oz) M Vag-Spont   SEKOU        Per the EMR, Pa has a history of two intrauterine fetal demises:  18w4d loss (46, XY) in 10/2023   19w6d loss (46,XY) in     Per a genetic counseling note on 23:  Complete genetic screening/testing:   Karyotype in , normal male (46, XY)  Karyotype in , normal male (46, XY)  Carrier screening through IVF clinic, patient reports that this was normal    CURRENT PREGNANCY    Current Age: 38 year old   Age at Delivery: 39 year old  ADELE: 12/26/2024, by Est. Date of Conception                                   Gestational Age: 19w5d  This pregnancy is a single gestation.   This pregnancy was conceived with in vitro fertilization (IVF). The following was discussed:     Embryo transfer date: 5/9/24, per patient  Frozen embryo from 2020  Preimplantation genetic testing (PGT) was performed on the embryos and the results were normal (per patient report the provider stated that chromosomes were normal; results not available for review)  PGT-A is a screening test. Therefore, a normal PGT-A result significantly reduces but does not eliminate the possibility of aneuploidy. Invasive testing (such as amniocentesis) is recommended if the patient desires definitive information regarding aneuploidy in pregnancy. While NIPT is still recommended, there are limitations of NIPT following PGT-A and that pursuing multiple screening tests may result in conflicting information in which case the option of invasive testing would be reviewed again.  Fetal echocardiogram will be recommended and scheduled after the 18-20 week fetal anatomy ultrasound  The average pregnancy has a 0.5-1.0% chance of a congenital heart defect. However, studies suggest an increased chance for a heart defect for IVF pregnancies, with estimates ranging from 1-3.3%. Fetal echocardiograms are typically performed at 20 to 24 weeks gestation.    MEDICAL HISTORY   Per the medical record, Pa's medical history includes hypothyroidism and chronic hepatitis B. For a complete review of her diagnoses and medical history, please refer to the medical record.       FAMILY HISTORY   A three-generation pedigree was obtained previously by a genetic counselor with [facility] on 11/29/23. Today, we reviewed family history. The family history was reported by Pa and their partner.    The following findings were reported on 11/29/23:  A 3 generation pedigree was  "obtained at today's visit. Please see GC questionnaire for the completed pedigree. Of note, she mentioned that she has had two ectopic pregnancies, one in 2012 and the ruptured one followed by salpingectomy in 2013. She has since utilized IVF for her pregnancies and had a successful term birth in 2022.      Maternal history was otherwise unremarkable.       Paternal history notable for:  A niece in first grade who is described as \"slow\" regarding her cognition and learning. She is very small for her age, appearing to be closer in size to a 3-year-old per Pa. Additionally, Ms. Ibarra noted that she has facial features that are different from her other family member. No genetic testing or evaluation has been performed for this niece. Both speech/language delays and developmental/motor delays were noted as well.   Her older brother was also noted to be \"slow\" but less affected than his sister. No genetic testing has been performed for him.   Maternal ethnicity: [facility]   Paternal ethnicity: [facility]   Consanguinity: none noted.     The following significant findings were reported today for Pa's family:   Sister had a miscarriage and 2 stillbirths (potentially males; Pa reports that the history is similar to her IUFD history)  Stillbirth may sometimes be due to chromosomal or genetic differences. Please see the \"risk assessment for inherited conditions and carrier screening options\" section for more details.    The following significant updates were reported today for her partner's family:   Previously reported niece who is \"slow\" is the child of his half brother. She may have autism. She is also reported to be small for her age.   We reviewed how these features may sometimes be due to an underlying genetic condition.     Otherwise, the reported family history is unremarkable for multiple miscarriages, other stillbirths/infant deaths, birth defects, other intellectual disabilities/developmental delays/autism, known genetic " conditions, cancer under the age of 50, and consanguinity.     Please see the genetic counseling note with [facility] on 24 for their additional risk assessment.        RISK ASSESSMENT FOR INHERITED CONDITIONS AND CARRIER SCREENING OPTIONS   Expanded carrier screening is available to screen for autosomal recessive conditions and X-linked conditions in a large list of genes. Carrier screening does not test the pregnancy but gives a risk assessment for the pregnancy and future pregnancies to have the condition. Expanded carrier screening is designed to identify carrier status for conditions that are primarily childhood or adolescent onset. Expanded carrier screening does not evaluate for adult-onset conditions such as hereditary cancer syndromes, dementia/Alzheimer's disease, or cardiovascular disease risk factors. Additionally, expanded carrier screening is not comprehensive for all known genetic diseases or inherited conditions. Carrier screening does not test for all genetic and health conditions or risk factors.     Autosomal recessive conditions happen when a mutation has been inherited from the egg and sperm and include conditions like cystic fibrosis, thalassemia, hearing loss, spinal muscular atrophy, and more. We reviewed that when both biological parents carry a harmful genetic change in a gene associated with autosomal recessive inheritance, each of their pregnancies has a 1 in 4 (25%) chance to be affected by that condition. X-linked conditions happen when a mutation has been inherited from the egg and include conditions like fragile X syndrome.With X-linked conditions, the specific risk generally depends on the chromosomal sex of the fetus, with XY individuals (generally male) being most severely affected.      screening  is also performed following delivery. About [place]  Screening    The patient does NOT have a family history of known inherited conditions. This does NOT mean the patient  and/or their partner is not a carrier of a condition. Approximately 90% of couples at an increased reproductive risk for an inherited condition have no family history of that condition.     Per the medical record, the patient had carrier screening in the past. However, records were not available for review today. The patient is uncertain of how many conditions she may have had screening for. We discussed that if the patient is able to provide records, I could review which conditions were included. Additional screening may be available. Current panels can screen for hundreds of conditions.     There is a possibility that an underlying inherited condition is the explanation for Pa's previous stillbirths. If an inherited condition were the explanation for the stillbirth, the risk for another child to be affected could be as high as 50%. Pa had discussed the option of single gene testing for her previous pregnancy loss with genetic counselor Edith [facility]jivar [facility], through a study at [place]. However, while offered, this study was never preformed for Pa. There may not be any preserved DNA from her previous loss available, but we could check with our labs. Today, Pa declined checking with cytogenetics to see if there is any preserved DNA available from her previous loss.     While previous losses could be unrelated to genetic syndromes, Pa and her sister's histories of stillbirth (both are reported to have had two male demises each at about mid pregnancy) could elevate the concern for an X-linked condition. While Pa's previous losses had normal karyotype results per the EMR, single gene testing was not performed. Pa is welcome to contact me in the future if she wishes to pursue further carrier screening.    RISK ASSESSMENT FOR CHROMOSOME CONDITIONS   We explained that the risk for fetal chromosome abnormalities increases with maternal age. We discussed specific features of common chromosome abnormalities, including trisomy  21 (Down syndrome), trisomy 13, trisomy 18, and sex chromosome trisomies.    Pa's pregnancy was achieved via a frozen embryo from 2020. The risk for chromosome conditions depends on Pa's age at egg retrieval. If assuming that Pa was ~34 years of age at egg retrieval, then the following age related risks would apply:    At age 34 at midtrimester, the risk to have a baby with Down syndrome is 1 in 342.   At age 34 at midtrimester, the risk to have a baby with any chromosome abnormality is 1 in  172.     Pa had genetic screening earlier in this pregnancy. Their non-invasive prenatal test was screen negative or low risk for screened conditions     Non-invasive prenatal testing (NIPT) results  Maternal plasma cell-free DNA testing  Screens for fetal trisomy 21, trisomy 13, trisomy 18, and sex chromosome aneuploidy  Pa had a [employer] test earlier in pregnancy; we reviewed the results today, which are low risk.  The NIPT did include sex chromosome aneuploidies and the result was low risk. The predicted sex is XX, which is typically female.  Given the accuracy of this test, these results greatly decrease the chance for certain fetal chromosome abnormalities  We discussed the limitations of normal NIPT results  Maternal serum AFP only to screen for open neural tube defects (after 15 weeks) results were within normal limits at a 0.95 MoM, which decreased the risk of an open neural tube defect.    GENETIC TESTING OPTIONS   Genetic testing during a pregnancy includes screening and diagnostic procedures.      Screening tests are non-invasive which means no risk to the pregnancy and includes ultrasounds and blood work. The benefits and limitations of screening were reviewed. Screening tests provide a risk assessment (chance) specific to the pregnancy for certain fetal chromosome abnormalities but cannot definitively diagnose or exclude a fetal chromosome abnormality. Follow-up genetic counseling and consideration of  diagnostic testing is recommended with any abnormal screening result. Diagnostic testing during a pregnancy is more certain and can test for more conditions. However, the tests do have a risk of miscarriage that requires careful consideration. These tests can detect fetal chromosome abnormalities with greater than 99% certainty. Results can be compromised by maternal cell contamination or mosaicism and are limited by the resolution of current genetic testing technology.     There is no screening or diagnostic test that detects all forms of birth defects or intellectual disability.     We discussed the following screening options:   Non-invasive prenatal testing (NIPT)  Please see details discussed above.    We discussed the following ultrasound options:  Comprehensive level II ultrasound (Fetal Anatomy Ultrasound)  Ultrasound done between 18-20 weeks gestation  Screens for major birth defects and markers for aneuploidy (like trisomy 21 and trisomy 18)  Includes looking at the fetus/baby's growth, heart, organs (stomach, kidneys), placenta, and amniotic fluid    We discussed the following diagnostic options:   Amniocentesis  Invasive diagnostic procedure done after 15 weeks gestation  The procedure collects a small sample of amniotic fluid for the purpose of chromosomal testing and/or other genetic testing  Diagnostic result; more than 99% sensitivity for fetal chromosome abnormalities  Testing for AFP in the amniotic fluid can test for open neural tube defects    It was a pleasure to be involved with Pa s care. Face-to-face time of the meeting was 30 minutes.    Ercih Silveira MS, [facility]  Board Certified and [place] Licensed Genetic Counselor  [facility] [place]  Maternal Fetal Medicine  Office: [phone]  [facility]: [phone]   Fax: [phone]  [facility]

## 2024-08-07 ENCOUNTER — HOSPITAL ENCOUNTER (EMERGENCY)
Facility: HOSPITAL | Age: 39
Discharge: HOME | End: 2024-08-08
Attending: EMERGENCY MEDICINE | Admitting: EMERGENCY MEDICINE
Payer: COMMERCIAL

## 2024-08-07 DIAGNOSIS — Z33.1 CURRENT PREGNANCY DETERMINED BY HISTORY: ICD-10-CM

## 2024-08-07 DIAGNOSIS — R07.89 LEFT-SIDED CHEST WALL PAIN: ICD-10-CM

## 2024-08-07 PROCEDURE — 99285 EMERGENCY DEPT VISIT HI MDM: CPT | Mod: 25

## 2024-08-07 ASSESSMENT — COLUMBIA-SUICIDE SEVERITY RATING SCALE - C-SSRS
2. HAVE YOU ACTUALLY HAD ANY THOUGHTS OF KILLING YOURSELF IN THE PAST MONTH?: NO
6. HAVE YOU EVER DONE ANYTHING, STARTED TO DO ANYTHING, OR PREPARED TO DO ANYTHING TO END YOUR LIFE?: NO
1. IN THE PAST MONTH, HAVE YOU WISHED YOU WERE DEAD OR WISHED YOU COULD GO TO SLEEP AND NOT WAKE UP?: NO

## 2024-08-08 ENCOUNTER — APPOINTMENT (OUTPATIENT)
Dept: CT IMAGING | Facility: HOSPITAL | Age: 39
End: 2024-08-08
Attending: EMERGENCY MEDICINE
Payer: COMMERCIAL

## 2024-08-08 VITALS
OXYGEN SATURATION: 98 % | WEIGHT: 113 LBS | TEMPERATURE: 98.1 F | HEART RATE: 72 BPM | DIASTOLIC BLOOD PRESSURE: 57 MMHG | HEIGHT: 57 IN | RESPIRATION RATE: 18 BRPM | BODY MASS INDEX: 24.38 KG/M2 | SYSTOLIC BLOOD PRESSURE: 97 MMHG

## 2024-08-08 LAB
ANION GAP SERPL CALCULATED.3IONS-SCNC: 10 MMOL/L (ref 7–15)
APTT PPP: 26 SECONDS (ref 22–38)
ATRIAL RATE - MUSE: 76 BPM
BASOPHILS # BLD AUTO: 0 10E3/UL (ref 0–0.2)
BASOPHILS NFR BLD AUTO: 1 %
BUN SERPL-MCNC: 12.4 MG/DL (ref 6–20)
CALCIUM SERPL-MCNC: 7.9 MG/DL (ref 8.8–10.4)
CHLORIDE SERPL-SCNC: 108 MMOL/L (ref 98–107)
CREAT SERPL-MCNC: 0.53 MG/DL (ref 0.51–0.95)
D DIMER PPP FEU-MCNC: 0.79 UG/ML FEU (ref 0–0.5)
DIASTOLIC BLOOD PRESSURE - MUSE: NORMAL MMHG
EGFRCR SERPLBLD CKD-EPI 2021: >90 ML/MIN/1.73M2
EOSINOPHIL # BLD AUTO: 0.1 10E3/UL (ref 0–0.7)
EOSINOPHIL NFR BLD AUTO: 1 %
ERYTHROCYTE [DISTWIDTH] IN BLOOD BY AUTOMATED COUNT: 14.1 % (ref 10–15)
FLUAV RNA SPEC QL NAA+PROBE: NEGATIVE
FLUBV RNA RESP QL NAA+PROBE: NEGATIVE
GLUCOSE SERPL-MCNC: 97 MG/DL (ref 70–99)
HCO3 SERPL-SCNC: 21 MMOL/L (ref 22–29)
HCT VFR BLD AUTO: 34.1 % (ref 35–47)
HGB BLD-MCNC: 11 G/DL (ref 11.7–15.7)
IMM GRANULOCYTES # BLD: 0 10E3/UL
IMM GRANULOCYTES NFR BLD: 1 %
INR PPP: 0.98 (ref 0.85–1.15)
INTERPRETATION ECG - MUSE: NORMAL
LYMPHOCYTES # BLD AUTO: 1.8 10E3/UL (ref 0.8–5.3)
LYMPHOCYTES NFR BLD AUTO: 22 %
MCH RBC QN AUTO: 26.6 PG (ref 26.5–33)
MCHC RBC AUTO-ENTMCNC: 32.3 G/DL (ref 31.5–36.5)
MCV RBC AUTO: 83 FL (ref 78–100)
MONOCYTES # BLD AUTO: 0.4 10E3/UL (ref 0–1.3)
MONOCYTES NFR BLD AUTO: 5 %
NEUTROPHILS # BLD AUTO: 5.7 10E3/UL (ref 1.6–8.3)
NEUTROPHILS NFR BLD AUTO: 71 %
NRBC # BLD AUTO: 0 10E3/UL
NRBC BLD AUTO-RTO: 0 /100
NT-PROBNP SERPL-MCNC: 42 PG/ML (ref 0–450)
P AXIS - MUSE: 72 DEGREES
PLATELET # BLD AUTO: 209 10E3/UL (ref 150–450)
POTASSIUM SERPL-SCNC: 3.3 MMOL/L (ref 3.4–5.3)
PR INTERVAL - MUSE: 128 MS
QRS DURATION - MUSE: 76 MS
QT - MUSE: 388 MS
QTC - MUSE: 436 MS
R AXIS - MUSE: 73 DEGREES
RBC # BLD AUTO: 4.13 10E6/UL (ref 3.8–5.2)
RSV RNA SPEC NAA+PROBE: NEGATIVE
SARS-COV-2 RNA RESP QL NAA+PROBE: NEGATIVE
SODIUM SERPL-SCNC: 139 MMOL/L (ref 135–145)
SYSTOLIC BLOOD PRESSURE - MUSE: NORMAL MMHG
T AXIS - MUSE: 66 DEGREES
TROPONIN T SERPL HS-MCNC: <6 NG/L
VENTRICULAR RATE- MUSE: 76 BPM
WBC # BLD AUTO: 8 10E3/UL (ref 4–11)

## 2024-08-08 PROCEDURE — 85379 FIBRIN DEGRADATION QUANT: CPT | Performed by: EMERGENCY MEDICINE

## 2024-08-08 PROCEDURE — 84484 ASSAY OF TROPONIN QUANT: CPT | Performed by: EMERGENCY MEDICINE

## 2024-08-08 PROCEDURE — 80048 BASIC METABOLIC PNL TOTAL CA: CPT | Performed by: EMERGENCY MEDICINE

## 2024-08-08 PROCEDURE — 71275 CT ANGIOGRAPHY CHEST: CPT

## 2024-08-08 PROCEDURE — 85025 COMPLETE CBC W/AUTO DIFF WBC: CPT | Performed by: EMERGENCY MEDICINE

## 2024-08-08 PROCEDURE — 250N000011 HC RX IP 250 OP 636: Performed by: EMERGENCY MEDICINE

## 2024-08-08 PROCEDURE — 85610 PROTHROMBIN TIME: CPT | Performed by: EMERGENCY MEDICINE

## 2024-08-08 PROCEDURE — 83880 ASSAY OF NATRIURETIC PEPTIDE: CPT | Performed by: EMERGENCY MEDICINE

## 2024-08-08 PROCEDURE — 85730 THROMBOPLASTIN TIME PARTIAL: CPT | Performed by: EMERGENCY MEDICINE

## 2024-08-08 PROCEDURE — 87637 SARSCOV2&INF A&B&RSV AMP PRB: CPT | Performed by: EMERGENCY MEDICINE

## 2024-08-08 PROCEDURE — 93005 ELECTROCARDIOGRAM TRACING: CPT | Performed by: EMERGENCY MEDICINE

## 2024-08-08 PROCEDURE — 36415 COLL VENOUS BLD VENIPUNCTURE: CPT | Performed by: EMERGENCY MEDICINE

## 2024-08-08 RX ORDER — IOPAMIDOL 755 MG/ML
75 INJECTION, SOLUTION INTRAVASCULAR ONCE
Status: COMPLETED | OUTPATIENT
Start: 2024-08-08 | End: 2024-08-08

## 2024-08-08 RX ADMIN — IOPAMIDOL 75 ML: 755 INJECTION, SOLUTION INTRAVENOUS at 02:24

## 2024-08-08 ASSESSMENT — ACTIVITIES OF DAILY LIVING (ADL)
ADLS_ACUITY_SCORE: 35

## 2024-08-08 NOTE — DISCHARGE INSTRUCTIONS
Suspected chest wall pain.  Negative EKG.  Negative heart labs.  No evidence for congestive heart failure, pneumonia, blood clot.  Ice off-and-on to sore areas can help with pain.  Over-the-counter Tylenol every 6 hours can help with pain.  See your doctor or clinic for follow-up within the week.  See them sooner if worse or problems or concerns.    Incidental CAT scan findings:   1.  There is a left upper lung nodule which is small.  With your history of non-smoking, radiology recommends no follow-up is necessary.  2.  Incidental 1.8 cm right thyroid gland nodule.  Radiology recommends that your doctor get a nonemergent ultrasound down the road.

## 2024-08-08 NOTE — ED PROVIDER NOTES
EMERGENCY DEPARTMENT ENCOUNTER      NAME: Gwyn Ibarra  AGE: 39 year old female  YOB: 1985  MRN: [id number]  EVALUATION DATE & TIME: 2024 11:53 PM    PCP: Lazara Sharma    ED PROVIDER: Jasmeet Blue M.D.      Chief Complaint   Patient presents with    Shortness of Breath    Chest Pain         FINAL IMPRESSION:  1.  Acute chest pain, suspect chest wall pain.    2.  Dyspnea.  3.  Current pregnancy.      ED COURSE & MEDICAL DECISION MAKIN:30 AM.  I met with the patient to gather history and to perform my initial exam. We discussed plans for the ED course, including diagnostic testing and treatment. PPE worn: cloth mask.  Patient currently pregnant almost 20 weeks along.  No bleeding, contractions, or spotting, but around 9:00 tonight feeling of chest pain in the left chest and feeling short of breath.  Pain was not reproducible with palpation or or inspiration, but was somewhat reproducible with movement.  No personal or family history of blood clots.  Patient worried about heart attack and so forth.  No personal or family cardiac history.  We did talk about the plan about management of checking a D-dimer first before any imaging.  1:51 PM.  EKG unremarkable.  CBC unremarkable.  Coagulation functions unremarkable.  D-dimer elevated at 0.79.  A chest CTA was ordered after explaining the findings and the need for testing to the patient and family.  Chemistry is negative and borderline potassium 3.3 and bicarbonate of 21.  Calcium 7.9.  Troponin and BNP negative.  Viral testing negative.  2:12 AM.  Risk and benefits of pulmonary embolus and/or chest CT for pulmonary embolus examination explained to the patient.  Patient aware of the findings and in agreement for the procedure.  2:54 AM.  Chest CTA showing no PE, pneumonia, or other pathology.  Incidental findings include a left upper lobe tiny nodule.  No follow-up is recommended for non-smokers.  Incidental 1.8 cm right thyroid nodule and they  recommend nonemergent ultrasound follow-up.  Results communicated to the patient.  Patient will be discharged to home.  Patient in agreement.    Pertinent Labs & Imaging studies reviewed. (See chart for details)  39 year old female presents to the Emergency Department for evaluation of chest pain and shortness of breath.    At the conclusion of the encounter I discussed the results of all of the tests and the disposition. The questions were answered. The patient or family acknowledged understanding and was agreeable with the care plan.              Medical Decision Making  Obtained supplemental history:Supplemental history obtained?: No  Reviewed external records: Both inpatient and outpatient computer records were reviewed.  Care impacted by chronic illness: Multigravida, current pregnancy, chronic hepatitis B, prediabetes.  Care significantly affected by social determinants of health:Access to Medical Care  Did you consider but not order tests?: Work up considered but not performed and documented in chart, if applicable  Did you interpret images independently?: Independent interpretation of ECG and images noted in documentation, when applicable.  Consultation discussion with other provider:Did you involve another provider (consultant, MH, pharmacy, etc.)?: No  Anticipate discharge home pending negative evaluation.      MEDICATIONS GIVEN IN THE EMERGENCY:  Medications - No data to display    NEW PRESCRIPTIONS STARTED AT TODAY'S ER VISIT  New Prescriptions    No medications on file          =================================================================    HPI    Patient information was obtained from: The patient.    Use of : N/A       Gwyn Ibarra is a 39 year old female with a pertinent history of chronic hepatitis B and current pregnancy, multigravida, who presents to this ED today for evaluation of onset 9 PM tonight of left chest pain reproducible with movement, but not with palpation or inspiration,  "and a feeling of shortness of breath.  Patient worried given the fact she is approximately 20 weeks pregnant.  Patient has multigravida.  Multiple previous miscarriages.    She does not identify any waxing or waning symptoms otherwise, exacerbating or alleviating features, associated symptoms except as mentioned.  She otherwise denies any pain related complaints.    REVIEW OF SYSTEMS   Review of Systems patient complaining of shortness of breath and chest pain.  Denies cough, fever, abdominal pain, contractions, vaginal bleeding or spotting.    PAST MEDICAL HISTORY:  Past Medical History:   Diagnosis Date    Liver disease     Miscarriage     Single liveborn infant delivered vaginally 05/25/2022    Thyroid disease        PAST SURGICAL HISTORY:  Past Surgical History:   Procedure Laterality Date    LAPAROSCOPY FOR ECTOPIC PREGNANCY Right 10/27/2010    LAPAROSCOPY FOR ECTOPIC PREGNANCY Left 11/27/2012           CURRENT MEDICATIONS:    doxylamine (UNISOM) 25 MG TABS tablet  levothyroxine (SYNTHROID/LEVOTHROID) 25 MCG tablet  Prenatal Vit-Fe Fumarate-FA (PRENATAL MULTIVITAMIN W/IRON) 27-0.8 MG tablet        ALLERGIES:  No Known Allergies    FAMILY HISTORY:  No family history on file.    SOCIAL HISTORY:   Social History     Socioeconomic History    Marital status:     Number of children: 3   Tobacco Use    Smoking status: Never    Smokeless tobacco: Never   Substance and Sexual Activity    Alcohol use: Not Currently    Drug use: Never    Sexual activity: Yes     No drugs, alcohol, or tobacco.  VITALS:  /52   Pulse 74   Temp 98.1  F (36.7  C)   Resp 18   Ht 1.448 m (4' 9\")   Wt 51.3 kg (113 lb)   SpO2 100%   BMI 24.45 kg/m      PHYSICAL EXAM    Vital Signs:  /52   Pulse 74   Temp 98.1  F (36.7  C)   Resp 18   Ht 1.448 m (4' 9\")   Wt 51.3 kg (113 lb)   SpO2 100%   BMI 24.45 kg/m    General:  On entering the room she is in no apparent distress.    Neck:  Neck supple with full range of motion " and nontender.    Back:  Back and spine are nontender.  No costovertebral angle tenderness.    HEENT:  Oropharynx clear with moist mucous membranes.  HEENT unremarkable.    Pulmonary:  Chest clear to auscultation without rhonchi rales or wheezing.  Pain reproducible with movement earlier, but not with inspiration or palpation at this time.  Cardiovascular:  Cardiac regular rate and rhythm without murmurs rubs or gallops.    Abdomen:  Abdomen soft nontender.  There is no rebound or guarding.    Muskuloskeletal:  She moves all 4 without any difficulty and has normal neurovascular exams.  Extremities without clubbing, cyanosis, or edema.  Legs and calves are nontender.    Neuro:  She is alert and oriented ×3 and moves all extremities symmetrically.    Psych:  Normal affect.    Skin:  Unremarkable and warm and dry.       LAB:  All pertinent labs reviewed and interpreted.  Labs Ordered and Resulted from Time of ED Arrival to Time of ED Departure   D DIMER QUANTITATIVE - Abnormal       Result Value    D-Dimer Quantitative 0.79 (*)    BASIC METABOLIC PANEL - Abnormal    Sodium 139      Potassium 3.3 (*)     Chloride 108 (*)     Carbon Dioxide (CO2) 21 (*)     Anion Gap 10      Urea Nitrogen 12.4      Creatinine 0.53      GFR Estimate >90      Calcium 7.9 (*)     Glucose 97     CBC WITH PLATELETS AND DIFFERENTIAL - Abnormal    WBC Count 8.0      RBC Count 4.13      Hemoglobin 11.0 (*)     Hematocrit 34.1 (*)     MCV 83      MCH 26.6      MCHC 32.3      RDW 14.1      Platelet Count 209      % Neutrophils 71      % Lymphocytes 22      % Monocytes 5      % Eosinophils 1      % Basophils 1      % Immature Granulocytes 1      NRBCs per 100 WBC 0      Absolute Neutrophils 5.7      Absolute Lymphocytes 1.8      Absolute Monocytes 0.4      Absolute Eosinophils 0.1      Absolute Basophils 0.0      Absolute Immature Granulocytes 0.0      Absolute NRBCs 0.0     INR - Normal    INR 0.98     PARTIAL THROMBOPLASTIN TIME - Normal    aPTT  26     TROPONIN T, HIGH SENSITIVITY - Normal    Troponin T, High Sensitivity <6     NT PROBNP INPATIENT - Normal    N terminal Pro BNP Inpatient 42     INFLUENZA A/B, RSV, & SARS-COV2 PCR - Normal    Influenza A PCR Negative      Influenza B PCR Negative      RSV PCR Negative      SARS CoV2 PCR Negative         RADIOLOGY:  Reviewed all pertinent imaging. Please see official radiology report.  CT Chest Pulmonary Embolism w Contrast   Final Result   IMPRESSION:   1.  No infiltrate, pleural effusion or pulmonary embolism.   2.  5 mm left upper lobe nodule. Follow-up according guidelines below.   3. 1.8 cm right thyroid nodule. Nonemergent thyroid ultrasound follow-up recommended.      REFERENCE:   Guidelines for Management of Incidental Pulmonary Nodules Detected on CT Images: From the Fleischner Society 2017.    Guidelines apply to incidental nodules in patients who are 35 years or older.   Guidelines do not apply to lung cancer screening, patients with immunosuppression, or patients with known primary cancer.      SINGLE NODULE   Nodule size <6 mm   Low-risk patients: No follow-up needed.   High-risk patients: Optional follow-up at 12 months.                             EKG:    No previous for comparison.  Normal sinus rhythm at 76.  Normal EKG.    I have independently reviewed and interpreted the EKG(s) documented above.      Jasmeet Blue M.D.  Emergency Medicine  [facility] EMERGENCY DEPARTMENT  [facility] [facility] [id number] [phone]  Dept: [phone]       Jasmeet Blue MD  08/08/24 0255

## 2024-08-08 NOTE — ED TRIAGE NOTES
Pt arrives with . Pt reports shortness of breath and chest pain that started at 2100. Pt has no experience with this and denies N/V. The pain is in the middle of her chest and does not radiate anywhere.     Pt is 20 weeks pregnant and nursing.

## 2024-08-19 ENCOUNTER — LAB REQUISITION (OUTPATIENT)
Dept: LAB | Facility: CLINIC | Age: 39
End: 2024-08-19
Payer: COMMERCIAL

## 2024-08-19 DIAGNOSIS — E03.9 HYPOTHYROIDISM, UNSPECIFIED: ICD-10-CM

## 2024-08-19 PROCEDURE — 84443 ASSAY THYROID STIM HORMONE: CPT | Performed by: OBSTETRICS & GYNECOLOGY

## 2024-08-20 ENCOUNTER — TRANSFERRED RECORDS (OUTPATIENT)
Dept: HEALTH INFORMATION MANAGEMENT | Facility: CLINIC | Age: 39
End: 2024-08-20
Payer: COMMERCIAL

## 2024-08-20 LAB — TSH SERPL DL<=0.005 MIU/L-ACNC: 1.15 UIU/ML (ref 0.3–4.2)

## 2024-09-09 DIAGNOSIS — O09.819 PREGNANCY CONCEIVED THROUGH IN VITRO FERTILIZATION: Primary | ICD-10-CM

## 2024-09-13 ENCOUNTER — HOSPITAL ENCOUNTER (OUTPATIENT)
Dept: ULTRASOUND IMAGING | Facility: HOSPITAL | Age: 39
Discharge: HOME | End: 2024-09-13
Attending: OBSTETRICS & GYNECOLOGY
Payer: COMMERCIAL

## 2024-09-13 ENCOUNTER — HOSPITAL ENCOUNTER (OUTPATIENT)
Dept: ULTRASOUND IMAGING | Facility: HOSPITAL | Age: 39
Discharge: HOME | End: 2024-09-13
Payer: COMMERCIAL

## 2024-09-13 DIAGNOSIS — E04.1 NONTOXIC SINGLE THYROID NODULE: ICD-10-CM

## 2024-09-13 DIAGNOSIS — B18.1 CHRONIC VIRAL HEPATITIS B WITHOUT DELTA AGENT AND WITHOUT COMA (H): ICD-10-CM

## 2024-09-13 PROCEDURE — 76705 ECHO EXAM OF ABDOMEN: CPT

## 2024-09-13 PROCEDURE — 76536 US EXAM OF HEAD AND NECK: CPT

## 2024-09-16 ENCOUNTER — TRANSFERRED RECORDS (OUTPATIENT)
Dept: HEALTH INFORMATION MANAGEMENT | Facility: CLINIC | Age: 39
End: 2024-09-16
Payer: COMMERCIAL

## 2024-09-18 ENCOUNTER — ANCILLARY PROCEDURE (OUTPATIENT)
Dept: ULTRASOUND IMAGING | Facility: HOSPITAL | Age: 39
End: 2024-09-18
Attending: STUDENT IN AN ORGANIZED HEALTH CARE EDUCATION/TRAINING PROGRAM
Payer: COMMERCIAL

## 2024-09-18 ENCOUNTER — OFFICE VISIT (OUTPATIENT)
Dept: MATERNAL FETAL MEDICINE | Facility: HOSPITAL | Age: 39
End: 2024-09-18
Attending: STUDENT IN AN ORGANIZED HEALTH CARE EDUCATION/TRAINING PROGRAM
Payer: COMMERCIAL

## 2024-09-18 DIAGNOSIS — O09.819 PREGNANCY CONCEIVED THROUGH IN VITRO FERTILIZATION: ICD-10-CM

## 2024-09-18 DIAGNOSIS — O09.812 PREGNANCY RESULTING FROM IN VITRO FERTILIZATION IN SECOND TRIMESTER: Primary | ICD-10-CM

## 2024-09-18 PROCEDURE — 76827 ECHO EXAM OF FETAL HEART: CPT | Mod: 26 | Performed by: STUDENT IN AN ORGANIZED HEALTH CARE EDUCATION/TRAINING PROGRAM

## 2024-09-18 PROCEDURE — 99212 OFFICE O/P EST SF 10 MIN: CPT | Mod: 25 | Performed by: STUDENT IN AN ORGANIZED HEALTH CARE EDUCATION/TRAINING PROGRAM

## 2024-09-18 PROCEDURE — 76825 ECHO EXAM OF FETAL HEART: CPT | Mod: 26 | Performed by: STUDENT IN AN ORGANIZED HEALTH CARE EDUCATION/TRAINING PROGRAM

## 2024-09-18 PROCEDURE — 93325 DOPPLER ECHO COLOR FLOW MAPG: CPT

## 2024-09-18 PROCEDURE — 76827 ECHO EXAM OF FETAL HEART: CPT

## 2024-09-18 PROCEDURE — 93325 DOPPLER ECHO COLOR FLOW MAPG: CPT | Mod: 26 | Performed by: STUDENT IN AN ORGANIZED HEALTH CARE EDUCATION/TRAINING PROGRAM

## 2024-09-18 NOTE — NURSING NOTE
Gwyn Ibarra is a  at 25w6d who presents to [facility] for scheduled IVF ECHO.  SBAR given to Dr. LAURA Mcallister, see note in Epic.

## 2024-09-18 NOTE — PROGRESS NOTES
"Please see \"Imaging\" tab under \"Chart Review\" for details of today's visit.    Jo Ann Mcallister    "

## 2024-09-22 ENCOUNTER — HOSPITAL ENCOUNTER (OUTPATIENT)
Facility: HOSPITAL | Age: 39
Discharge: HOME | End: 2024-09-22
Attending: OBSTETRICS & GYNECOLOGY | Admitting: OBSTETRICS & GYNECOLOGY
Payer: COMMERCIAL

## 2024-09-22 ENCOUNTER — HOSPITAL ENCOUNTER (OUTPATIENT)
Facility: HOSPITAL | Age: 39
End: 2024-09-22
Admitting: OBSTETRICS & GYNECOLOGY
Payer: COMMERCIAL

## 2024-09-22 VITALS
DIASTOLIC BLOOD PRESSURE: 66 MMHG | HEIGHT: 57 IN | HEART RATE: 90 BPM | OXYGEN SATURATION: 98 % | RESPIRATION RATE: 16 BRPM | BODY MASS INDEX: 26.97 KG/M2 | SYSTOLIC BLOOD PRESSURE: 101 MMHG | WEIGHT: 125 LBS | TEMPERATURE: 98.5 F

## 2024-09-22 PROBLEM — Z36.89 ENCOUNTER FOR TRIAGE IN PREGNANT PATIENT: Status: ACTIVE | Noted: 2024-09-22

## 2024-09-22 PROCEDURE — G0463 HOSPITAL OUTPT CLINIC VISIT: HCPCS

## 2024-09-22 RX ORDER — LIDOCAINE 40 MG/G
CREAM TOPICAL
Status: DISCONTINUED | OUTPATIENT
Start: 2024-09-22 | End: 2024-09-22 | Stop reason: HOSPADM

## 2024-09-22 ASSESSMENT — ACTIVITIES OF DAILY LIVING (ADL): ADLS_ACUITY_SCORE: 35

## 2024-09-22 NOTE — PROGRESS NOTES
Patient discharged home with significant other at 1753 ambulatory. Decreased pain/discomfort noted with t-pump. Reactive FHT. Denies leakage of fluid, vaginal bleeding. Active fetal movements at discharge. Ok to discharge per MD Terrell. Patient has follow up appointment on the 7th of next month. Reviewed discharge instructions with patient, no concerns/questions.     Jemal Gray RN

## 2024-09-22 NOTE — PROGRESS NOTES
Data: Gwyn MOORE Stacey a 39 year old  26w3d [facility]: 2024  4:25 PM.  Reason for maternal/fetal assessment is fell down 1 step of outdoor stair on to right knee and right sided abd. Patient reports tolerate intermittent cramping and decreased fetal movements. VSS. Fetal movement present. Patient denies uterine contractions, leaking of vaginal fluid/rupture of membranes, vaginal bleeding, pelvic pressure, nausea, vomiting, headache, visual disturbances, epigastric or RUQ pain, significant edema. Support person is present.   Action: Verbal consent for EFM and monitors placed upon arrival. Triage assessment completed. Bill of rights reviewed.  Response: Category  1  FHR. MD Terrell updated to above and orders received: discharge to home after 20 minute FHT. Patient verbalized agreement with plan.     Jemal Gray RN  2024 5:02 PM

## 2024-09-22 NOTE — DISCHARGE INSTRUCTIONS
EARLY LABOR DISCHARGE INSTRUCTIONS    You were seen for: Labor Assessment  You had (Test or Medicine): baby monitored    Refer to Any Day Now handout for tips on how to labor at home    WHEN TO CALL YOUR PROVIDER:  If this is your first baby: Your contractions (tightening) are 5 minutes apart, last more than 1 minute, and have been consistently getting stronger for 1 hour or more  If this is your second baby or beyond: Your contractions are less than 10 minutes apart and have been consistently getting stronger for 1 hour or more  Feeling your baby move less than usual  Temperature of 100.4 F (38 C) or higher  New fluid leaking from your vagina  Other signs your provider asked you to look for in your body  Vaginal bleeding (bright red blood)  Swelling in your face or more swelling in your hands or legs  Headaches that don't get better after taking Tylenol (acetaminophen)  Changes in your vision (blurry or seeing spots or stars)  Nausea (sick to your stomach) and vomiting (throwing up)  Heartburn that doesn't go away  Sudden, bad belly pain that is unlike your contractions    IF YOU ARRIVED WITH YOUR WATER ALREADY BROKEN (MEMBRANES RUPTURED):  Avoid placing anything in vagina, including intercourse (sex)  Check your temperature every 3 hours when awake  Call your provider/clinic if:  Your temperature is 100.4 F (38 C) or higher  Your fluid becomes not clear or is smelly  Your baby is moving less than usual  You don't go into labor within 24 hours of your water breaking  You have other concerns      FOLLOW UP:  As scheduled in the clinic    Provider/clinic number: Randy    Counting Your Baby's Kicks: Care Instructions  Overview     Counting your baby's kicks is one way your doctor can tell that your baby is healthy. You will probably feel your baby move for the first time between 16 and 22 weeks. The movement may feel like flutters rather than kicks. Your baby may move more at certain times of the day. When you are  "active, you may notice less kicking than when you are resting. At your prenatal visits, your doctor will ask whether the baby is active.  In your last trimester, your doctor may ask you to count the number of times you feel your baby move.  Follow-up care is a key part of your treatment and safety. Be sure to make and go to all appointments, and call your doctor if you are having problems. It's also a good idea to know your test results and keep a list of the medicines you take.  How do you count fetal kicks?  A common method of checking your baby's movement is to note the length of time it takes to count 10 movements (such as kicks, flutters, or rolls).  Pick your baby's most active time of day to count. This may be any time from morning to evening.  If you don't feel 10 movements in an hour, have something to eat or drink and count for another hour. If you don't feel at least 10 movements in the 2-hour period, call your doctor.  Do not use an at-home Doppler heart monitor in place of counting fetal movements.  When should you call for help?   Call your doctor now or seek immediate medical care if:    You feel fewer than 10 movements in a 2-hour period.     You noticed that your baby has stopped moving or is moving less than normal.   Watch closely for changes in your health, and be sure to contact your doctor if you have any problems.  Where can you learn more?  Go to https://www.[employer].net/patiented  Enter U048 in the search box to learn more about \"Counting Your Baby's Kicks: Care Instructions.\"  Current as of: July 10, 2023               Content Version: 14.0    9734-4673 [employer].   Care instructions adapted under license by your healthcare professional. If you have questions about a medical condition or this instruction, always ask your healthcare professional. [employer] disclaims any warranty or liability for your use of this information.        "

## 2024-10-05 ENCOUNTER — HEALTH MAINTENANCE LETTER (OUTPATIENT)
Age: 39
End: 2024-10-05

## 2024-10-07 ENCOUNTER — LAB REQUISITION (OUTPATIENT)
Dept: LAB | Facility: CLINIC | Age: 39
End: 2024-10-07

## 2024-10-07 DIAGNOSIS — E03.9 HYPOTHYROIDISM, UNSPECIFIED: ICD-10-CM

## 2024-10-07 DIAGNOSIS — Z11.3 ENCOUNTER FOR SCREENING FOR INFECTIONS WITH A PREDOMINANTLY SEXUAL MODE OF TRANSMISSION: ICD-10-CM

## 2024-10-07 DIAGNOSIS — Z36.89 ENCOUNTER FOR OTHER SPECIFIED ANTENATAL SCREENING: ICD-10-CM

## 2024-10-07 LAB
ERYTHROCYTE [DISTWIDTH] IN BLOOD BY AUTOMATED COUNT: 14 % (ref 10–15)
HCT VFR BLD AUTO: 34.8 % (ref 35–47)
HGB BLD-MCNC: 10.6 G/DL (ref 11.7–15.7)
MCH RBC QN AUTO: 25.1 PG (ref 26.5–33)
MCHC RBC AUTO-ENTMCNC: 30.5 G/DL (ref 31.5–36.5)
MCV RBC AUTO: 83 FL (ref 78–100)
PLATELET # BLD AUTO: 234 10E3/UL (ref 150–450)
RBC # BLD AUTO: 4.22 10E6/UL (ref 3.8–5.2)
T PALLIDUM AB SER QL: NONREACTIVE
T3FREE SERPL-MCNC: 2.3 PG/ML (ref 2–4.4)
T4 FREE SERPL-MCNC: 0.76 NG/DL (ref 0.9–1.7)
TSH SERPL DL<=0.005 MIU/L-ACNC: 1.89 UIU/ML (ref 0.3–4.2)
WBC # BLD AUTO: 6.5 10E3/UL (ref 4–11)

## 2024-10-07 PROCEDURE — 84481 FREE ASSAY (FT-3): CPT | Performed by: NURSE PRACTITIONER

## 2024-10-07 PROCEDURE — 84443 ASSAY THYROID STIM HORMONE: CPT | Performed by: NURSE PRACTITIONER

## 2024-10-07 PROCEDURE — 86780 TREPONEMA PALLIDUM: CPT | Performed by: NURSE PRACTITIONER

## 2024-10-07 PROCEDURE — 85018 HEMOGLOBIN: CPT | Performed by: NURSE PRACTITIONER

## 2024-10-07 PROCEDURE — 84439 ASSAY OF FREE THYROXINE: CPT | Performed by: NURSE PRACTITIONER

## 2024-10-21 ENCOUNTER — LAB REQUISITION (OUTPATIENT)
Dept: LAB | Facility: CLINIC | Age: 39
End: 2024-10-21

## 2024-10-21 DIAGNOSIS — O99.013 ANEMIA COMPLICATING PREGNANCY, THIRD TRIMESTER: ICD-10-CM

## 2024-10-21 PROCEDURE — 82728 ASSAY OF FERRITIN: CPT | Performed by: OBSTETRICS & GYNECOLOGY

## 2024-10-21 PROCEDURE — 83550 IRON BINDING TEST: CPT | Performed by: OBSTETRICS & GYNECOLOGY

## 2024-10-21 PROCEDURE — 84466 ASSAY OF TRANSFERRIN: CPT | Performed by: OBSTETRICS & GYNECOLOGY

## 2024-10-22 LAB
FERRITIN SERPL-MCNC: 8 NG/ML (ref 6–175)
IRON BINDING CAPACITY (ROCHE): 452 UG/DL (ref 240–430)
IRON SATN MFR SERPL: 8 % (ref 15–46)
IRON SERPL-MCNC: 38 UG/DL (ref 37–145)
TRANSFERRIN SERPL-MCNC: 366 MG/DL (ref 200–360)

## 2024-11-18 ENCOUNTER — LAB REQUISITION (OUTPATIENT)
Dept: LAB | Facility: CLINIC | Age: 39
End: 2024-11-18

## 2024-11-18 DIAGNOSIS — E03.9 HYPOTHYROIDISM, UNSPECIFIED: ICD-10-CM

## 2024-11-18 LAB
T3FREE SERPL-MCNC: 2.2 PG/ML (ref 2–4.4)
T4 FREE SERPL-MCNC: 0.75 NG/DL (ref 0.9–1.7)
TSH SERPL DL<=0.005 MIU/L-ACNC: 1.6 UIU/ML (ref 0.3–4.2)

## 2024-11-18 PROCEDURE — 84439 ASSAY OF FREE THYROXINE: CPT | Performed by: OBSTETRICS & GYNECOLOGY

## 2024-11-18 PROCEDURE — 84481 FREE ASSAY (FT-3): CPT | Performed by: OBSTETRICS & GYNECOLOGY

## 2024-11-18 PROCEDURE — 84443 ASSAY THYROID STIM HORMONE: CPT | Performed by: OBSTETRICS & GYNECOLOGY

## 2024-11-20 ENCOUNTER — HOSPITAL ENCOUNTER (OUTPATIENT)
Facility: HOSPITAL | Age: 39
End: 2024-11-20
Admitting: OBSTETRICS & GYNECOLOGY
Payer: COMMERCIAL

## 2024-11-20 ENCOUNTER — HOSPITAL ENCOUNTER (OUTPATIENT)
Facility: HOSPITAL | Age: 39
Discharge: HOME | End: 2024-11-20
Attending: OBSTETRICS & GYNECOLOGY | Admitting: OBSTETRICS & GYNECOLOGY
Payer: COMMERCIAL

## 2024-11-20 VITALS
DIASTOLIC BLOOD PRESSURE: 56 MMHG | SYSTOLIC BLOOD PRESSURE: 88 MMHG | BODY MASS INDEX: 28.48 KG/M2 | HEART RATE: 82 BPM | TEMPERATURE: 97.5 F | RESPIRATION RATE: 16 BRPM | WEIGHT: 132 LBS | HEIGHT: 57 IN

## 2024-11-20 RX ORDER — LIDOCAINE 40 MG/G
CREAM TOPICAL
Status: DISCONTINUED | OUTPATIENT
Start: 2024-11-20 | End: 2024-11-20 | Stop reason: HOSPADM

## 2024-11-20 RX ORDER — FERROUS SULFATE 325(65) MG
1 TABLET ORAL
Status: ON HOLD | COMMUNITY
Start: 2024-10-21

## 2024-11-20 ASSESSMENT — ACTIVITIES OF DAILY LIVING (ADL): ADLS_ACUITY_SCORE: 0

## 2024-11-20 NOTE — PROGRESS NOTES
Pa arrived ambulatory from home with complaints of decreased fetal movement and contractions. Patient denies any vaginal bleeding or leaking of fluids. Pt states that she was finn this weekend and was seen in the clinic where she received both doses of Betamethasone. Patient states she hasn't been feeling baby move as much since Monday. Reports occasional contractions. Abdomen soft, nontender.     Monitors applied. Patient given marker button to press when she feels fetal movement.     Dr Vamshi WILLETT paged.

## 2024-11-20 NOTE — PROGRESS NOTES
Patient reports positive fetal movement now. Dr Bonds in unit reviewing FHT tracing with RN. Order to discharge patient home with plans to follow up at scheduled appointment Monday 11/25. Patient given discharge paperwork and instructed to return to hospital if any vaginal bleeding or leaking of fluids, decreased fetal movement, or painful, regular contractions. Patient verbalizes understanding.

## 2024-11-20 NOTE — DISCHARGE INSTRUCTIONS
"  Counting Your Baby's Kicks: Care Instructions  Overview     Counting your baby's kicks is one way your doctor can tell that your baby is healthy. You will probably feel your baby move for the first time between 16 and 22 weeks. The movement may feel like flutters rather than kicks. Your baby may move more at certain times of the day. When you are active, you may notice less kicking than when you are resting. At your prenatal visits, your doctor will ask whether the baby is active.  In your last trimester, your doctor may ask you to count the number of times you feel your baby move.  Follow-up care is a key part of your treatment and safety. Be sure to make and go to all appointments, and call your doctor if you are having problems. It's also a good idea to know your test results and keep a list of the medicines you take.  How do you count fetal kicks?  A common method of checking your baby's movement is to note the length of time it takes to count 10 movements (such as kicks, flutters, or rolls).  Pick your baby's most active time of day to count. This may be any time from morning to evening.  If you don't feel 10 movements in an hour, have something to eat or drink and count for another hour. If you don't feel at least 10 movements in the 2-hour period, call your doctor.  Do not use an at-home Doppler heart monitor in place of counting fetal movements.  When should you call for help?   Call your doctor now or seek immediate medical care if:    You feel fewer than 10 movements in a 2-hour period.     You noticed that your baby has stopped moving or is moving less than normal.   Watch closely for changes in your health, and be sure to contact your doctor if you have any problems.  Where can you learn more?  Go to https://www.healthwise.net/patiented  Enter U048 in the search box to learn more about \"Counting Your Baby's Kicks: Care Instructions.\"  Current as of: July 10, 2023  Content Version: 14.2 2024 [employer].   Care instructions adapted under license by your healthcare professional. If you have questions about a medical condition or this instruction, always ask your healthcare professional. [employer] disclaims any warranty or liability for your use of this information.    Learning About When to Call Your Doctor During Pregnancy (After 20 Weeks)  Overview  It's common to have concerns about what might be a problem when you're pregnant. Most pregnancies don't have any serious problems. But it's still important to know when to call your doctor if you have certain symptoms or signs of labor.  These are general suggestions. Your doctor may give you some more information about when to call.  When to call your doctor (after 20 weeks)  Call 911  anytime you think you may need emergency care. For example, call if:  You have severe vaginal bleeding. This means you are soaking through a pad each hour for 2 or more hours.  You have sudden, severe pain in your belly.  You have chest pain, are short of breath, or cough up blood.  You passed out (lost consciousness).  You have a seizure.  You see or feel the umbilical cord.  You think you are about to deliver your baby and can't make it safely to the hospital or birthing center.  Call your doctor now or seek immediate medical care if:  You have vaginal bleeding.  You have belly pain.  You have a fever.  You are dizzy or lightheaded, or you feel like you may faint.  You have signs of a blood clot in your leg (called a deep vein thrombosis), such as:  Pain in the calf, back of the knee, thigh, or groin.  Swelling in your leg or groin.  A color change on the leg or groin. The skin may be reddish or purplish, depending on your usual skin color.  You have symptoms of preeclampsia, such as:  Sudden swelling of your face, hands, or feet.  New vision problems (such as dimness, blurring, or seeing spots).  A severe headache.  You have a sudden release of fluid  "from your vagina. (You think your water broke.)  You've been having regular contractions for an hour. This means that you've had at least 6 contractions within 1 hour, even after you change your position and drink fluids.  You notice that your baby has stopped moving or is moving less than normal.  You have signs of heart failure, such as:  New or increased shortness of breath.  New or worse swelling in your legs, ankles, or feet.  Sudden weight gain, such as more than 2 to 3 pounds in a day or 5 pounds in a week.  Feeling so tired or weak that you cannot do your usual activities.  You have symptoms of a urinary tract infection. These may include:  Pain or burning when you urinate.  A frequent need to urinate without being able to pass much urine.  Pain in the flank, which is just below the rib cage and above the waist on either side of the back.  Blood in your urine.  Watch closely for changes in your health, and be sure to contact your doctor if:  You have vaginal discharge that smells bad.  You feel sad, anxious, or hopeless for more than a few days.  You have skin changes, such as a rash, itching, or a yellow color to your skin.  You have other concerns about your pregnancy.  If you have labor signs at 37 weeks or more  If you have signs of labor at 37 weeks or more, your doctor may tell you to call when your labor becomes more active. Symptoms of active labor include:  Contractions that are regular.  Contractions that are less than 5 minutes apart.  Contractions that are hard to talk through.  Follow-up care is a key part of your treatment and safety. Be sure to make and go to all appointments, and call your doctor if you are having problems. It's also a good idea to know your test results and keep a list of the medicines you take.  Where can you learn more?  Go to https://www.healthwise.net/patiented  Enter N531 in the search box to learn more about \"Learning About When to Call Your Doctor During Pregnancy " "(After 20 Weeks).\"  Current as of: July 10, 2023  Content Version: 14.2 2024 [facility] [employer], [facility].   Care instructions adapted under license by your healthcare professional. If you have questions about a medical condition or this instruction, always ask your healthcare professional. [employer] disclaims any warranty or liability for your use of this information.    "

## 2024-12-03 ENCOUNTER — LAB REQUISITION (OUTPATIENT)
Dept: LAB | Facility: CLINIC | Age: 39
End: 2024-12-03
Payer: COMMERCIAL

## 2024-12-03 DIAGNOSIS — Z36.85 ENCOUNTER FOR ANTENATAL SCREENING FOR STREPTOCOCCUS B: ICD-10-CM

## 2024-12-04 LAB — GP B STREP DNA SPEC QL NAA+PROBE: POSITIVE

## 2024-12-11 ENCOUNTER — HOSPITAL ENCOUNTER (INPATIENT)
Facility: HOSPITAL | Age: 39
End: 2024-12-11
Attending: OBSTETRICS & GYNECOLOGY | Admitting: OBSTETRICS & GYNECOLOGY
Payer: COMMERCIAL

## 2024-12-11 DIAGNOSIS — Z98.891 S/P CESAREAN SECTION: ICD-10-CM

## 2024-12-11 PROBLEM — Z37.9 NORMAL LABOR: Status: ACTIVE | Noted: 2024-12-11

## 2024-12-11 LAB
ABO + RH BLD: NORMAL
BLD GP AB SCN SERPL QL: NEGATIVE
HGB BLD-MCNC: 10.9 G/DL (ref 11.7–15.7)
SPECIMEN EXP DATE BLD: NORMAL
T PALLIDUM AB SER QL: NONREACTIVE

## 2024-12-11 PROCEDURE — 370N000017 HC ANESTHESIA TECHNICAL FEE, PER MIN: Performed by: OBSTETRICS & GYNECOLOGY

## 2024-12-11 PROCEDURE — 00HU33Z INSERTION OF INFUSION DEVICE INTO SPINAL CANAL, PERCUTANEOUS APPROACH: ICD-10-PCS | Performed by: ANESTHESIOLOGY

## 2024-12-11 PROCEDURE — 250N000011 HC RX IP 250 OP 636: Performed by: OBSTETRICS & GYNECOLOGY

## 2024-12-11 PROCEDURE — 86780 TREPONEMA PALLIDUM: CPT | Performed by: OBSTETRICS & GYNECOLOGY

## 2024-12-11 PROCEDURE — 86900 BLOOD TYPING SEROLOGIC ABO: CPT | Performed by: OBSTETRICS & GYNECOLOGY

## 2024-12-11 PROCEDURE — 85018 HEMOGLOBIN: CPT | Performed by: OBSTETRICS & GYNECOLOGY

## 2024-12-11 PROCEDURE — 272N000001 HC OR GENERAL SUPPLY STERILE: Performed by: OBSTETRICS & GYNECOLOGY

## 2024-12-11 PROCEDURE — 250N000009 HC RX 250: Performed by: OBSTETRICS & GYNECOLOGY

## 2024-12-11 PROCEDURE — 120N000001 HC R&B MED SURG/OB

## 2024-12-11 PROCEDURE — 36415 COLL VENOUS BLD VENIPUNCTURE: CPT | Performed by: OBSTETRICS & GYNECOLOGY

## 2024-12-11 PROCEDURE — 250N000013 HC RX MED GY IP 250 OP 250 PS 637: Performed by: OBSTETRICS & GYNECOLOGY

## 2024-12-11 PROCEDURE — 710N000010 HC RECOVERY PHASE 1, LEVEL 2, PER MIN: Performed by: OBSTETRICS & GYNECOLOGY

## 2024-12-11 PROCEDURE — 250N000011 HC RX IP 250 OP 636: Performed by: ANESTHESIOLOGY

## 2024-12-11 PROCEDURE — 999N000249 HC STATISTIC C-SECTION ON UNIT

## 2024-12-11 PROCEDURE — 258N000003 HC RX IP 258 OP 636: Performed by: OBSTETRICS & GYNECOLOGY

## 2024-12-11 PROCEDURE — 360N000076 HC SURGERY LEVEL 3, PER MIN: Performed by: OBSTETRICS & GYNECOLOGY

## 2024-12-11 PROCEDURE — 3E0R3BZ INTRODUCTION OF ANESTHETIC AGENT INTO SPINAL CANAL, PERCUTANEOUS APPROACH: ICD-10-PCS | Performed by: ANESTHESIOLOGY

## 2024-12-11 RX ORDER — MISOPROSTOL 200 UG/1
400 TABLET ORAL
Status: DISCONTINUED | OUTPATIENT
Start: 2024-12-11 | End: 2024-12-11 | Stop reason: HOSPADM

## 2024-12-11 RX ORDER — FENTANYL/ROPIVACAINE/NS/PF 2MCG/ML-.1
PLASTIC BAG, INJECTION (ML) EPIDURAL
Status: DISCONTINUED | OUTPATIENT
Start: 2024-12-11 | End: 2024-12-11 | Stop reason: HOSPADM

## 2024-12-11 RX ORDER — NALOXONE HYDROCHLORIDE 0.4 MG/ML
0.4 INJECTION, SOLUTION INTRAMUSCULAR; INTRAVENOUS; SUBCUTANEOUS
Status: DISCONTINUED | OUTPATIENT
Start: 2024-12-11 | End: 2024-12-11 | Stop reason: HOSPADM

## 2024-12-11 RX ORDER — DEXAMETHASONE SODIUM PHOSPHATE 4 MG/ML
4 INJECTION, SOLUTION INTRA-ARTICULAR; INTRALESIONAL; INTRAMUSCULAR; INTRAVENOUS; SOFT TISSUE
Status: DISCONTINUED | OUTPATIENT
Start: 2024-12-11 | End: 2024-12-11 | Stop reason: HOSPADM

## 2024-12-11 RX ORDER — METOCLOPRAMIDE 10 MG/1
10 TABLET ORAL EVERY 6 HOURS PRN
Status: DISCONTINUED | OUTPATIENT
Start: 2024-12-11 | End: 2024-12-14 | Stop reason: HOSPADM

## 2024-12-11 RX ORDER — SIMETHICONE 80 MG
80 TABLET,CHEWABLE ORAL 4 TIMES DAILY PRN
Status: DISCONTINUED | OUTPATIENT
Start: 2024-12-11 | End: 2024-12-14 | Stop reason: HOSPADM

## 2024-12-11 RX ORDER — MODIFIED LANOLIN
OINTMENT (GRAM) TOPICAL
Status: DISCONTINUED | OUTPATIENT
Start: 2024-12-11 | End: 2024-12-14 | Stop reason: HOSPADM

## 2024-12-11 RX ORDER — MISOPROSTOL 200 UG/1
800 TABLET ORAL
Status: DISCONTINUED | OUTPATIENT
Start: 2024-12-11 | End: 2024-12-11 | Stop reason: HOSPADM

## 2024-12-11 RX ORDER — SODIUM CHLORIDE, SODIUM LACTATE, POTASSIUM CHLORIDE, CALCIUM CHLORIDE 600; 310; 30; 20 MG/100ML; MG/100ML; MG/100ML; MG/100ML
INJECTION, SOLUTION INTRAVENOUS CONTINUOUS
Status: DISCONTINUED | OUTPATIENT
Start: 2024-12-11 | End: 2024-12-11 | Stop reason: HOSPADM

## 2024-12-11 RX ORDER — TRANEXAMIC ACID 10 MG/ML
1 INJECTION, SOLUTION INTRAVENOUS EVERY 30 MIN PRN
Status: DISCONTINUED | OUTPATIENT
Start: 2024-12-11 | End: 2024-12-11 | Stop reason: HOSPADM

## 2024-12-11 RX ORDER — NALOXONE HYDROCHLORIDE 0.4 MG/ML
0.1 INJECTION, SOLUTION INTRAMUSCULAR; INTRAVENOUS; SUBCUTANEOUS
Status: DISCONTINUED | OUTPATIENT
Start: 2024-12-11 | End: 2024-12-11 | Stop reason: HOSPADM

## 2024-12-11 RX ORDER — CARBOPROST TROMETHAMINE 250 UG/ML
250 INJECTION, SOLUTION INTRAMUSCULAR
Status: DISCONTINUED | OUTPATIENT
Start: 2024-12-11 | End: 2024-12-14 | Stop reason: HOSPADM

## 2024-12-11 RX ORDER — SODIUM PHOSPHATE,MONO-DIBASIC 19G-7G/118
1 ENEMA (ML) RECTAL DAILY PRN
Status: DISCONTINUED | OUTPATIENT
Start: 2024-12-13 | End: 2024-12-14 | Stop reason: HOSPADM

## 2024-12-11 RX ORDER — ONDANSETRON 2 MG/ML
4 INJECTION INTRAMUSCULAR; INTRAVENOUS EVERY 6 HOURS PRN
Status: DISCONTINUED | OUTPATIENT
Start: 2024-12-11 | End: 2024-12-11 | Stop reason: HOSPADM

## 2024-12-11 RX ORDER — ACETAMINOPHEN 325 MG/1
650 TABLET ORAL EVERY 4 HOURS PRN
Status: DISCONTINUED | OUTPATIENT
Start: 2024-12-11 | End: 2024-12-11 | Stop reason: HOSPADM

## 2024-12-11 RX ORDER — ONDANSETRON 2 MG/ML
4 INJECTION INTRAMUSCULAR; INTRAVENOUS EVERY 6 HOURS PRN
Status: DISCONTINUED | OUTPATIENT
Start: 2024-12-11 | End: 2024-12-14 | Stop reason: HOSPADM

## 2024-12-11 RX ORDER — LIDOCAINE 40 MG/G
CREAM TOPICAL
Status: DISCONTINUED | OUTPATIENT
Start: 2024-12-11 | End: 2024-12-14 | Stop reason: HOSPADM

## 2024-12-11 RX ORDER — METOCLOPRAMIDE HYDROCHLORIDE 5 MG/ML
10 INJECTION INTRAMUSCULAR; INTRAVENOUS EVERY 6 HOURS PRN
Status: DISCONTINUED | OUTPATIENT
Start: 2024-12-11 | End: 2024-12-14 | Stop reason: HOSPADM

## 2024-12-11 RX ORDER — ONDANSETRON 4 MG/1
4 TABLET, ORALLY DISINTEGRATING ORAL EVERY 6 HOURS PRN
Status: DISCONTINUED | OUTPATIENT
Start: 2024-12-11 | End: 2024-12-11 | Stop reason: HOSPADM

## 2024-12-11 RX ORDER — METOCLOPRAMIDE HYDROCHLORIDE 5 MG/ML
INJECTION INTRAMUSCULAR; INTRAVENOUS
Status: DISCONTINUED
Start: 2024-12-11 | End: 2024-12-11 | Stop reason: HOSPADM

## 2024-12-11 RX ORDER — OXYTOCIN/0.9 % SODIUM CHLORIDE 30/500 ML
100-340 PLASTIC BAG, INJECTION (ML) INTRAVENOUS CONTINUOUS PRN
Status: DISCONTINUED | OUTPATIENT
Start: 2024-12-11 | End: 2024-12-14 | Stop reason: HOSPADM

## 2024-12-11 RX ORDER — LIDOCAINE 40 MG/G
CREAM TOPICAL
Status: DISCONTINUED | OUTPATIENT
Start: 2024-12-11 | End: 2024-12-11 | Stop reason: HOSPADM

## 2024-12-11 RX ORDER — LOPERAMIDE HYDROCHLORIDE 2 MG/1
2 CAPSULE ORAL
Status: DISCONTINUED | OUTPATIENT
Start: 2024-12-11 | End: 2024-12-11 | Stop reason: HOSPADM

## 2024-12-11 RX ORDER — CITRIC ACID/SODIUM CITRATE 334-500MG
30 SOLUTION, ORAL ORAL
Status: DISCONTINUED | OUTPATIENT
Start: 2024-12-11 | End: 2024-12-11 | Stop reason: HOSPADM

## 2024-12-11 RX ORDER — PENICILLIN G 3000000 [IU]/50ML
3 INJECTION, SOLUTION INTRAVENOUS EVERY 4 HOURS
Status: DISCONTINUED | OUTPATIENT
Start: 2024-12-11 | End: 2024-12-11

## 2024-12-11 RX ORDER — IBUPROFEN 800 MG/1
800 TABLET, FILM COATED ORAL EVERY 6 HOURS
Status: DISCONTINUED | OUTPATIENT
Start: 2024-12-12 | End: 2024-12-14 | Stop reason: HOSPADM

## 2024-12-11 RX ORDER — LOPERAMIDE HYDROCHLORIDE 2 MG/1
4 CAPSULE ORAL
Status: DISCONTINUED | OUTPATIENT
Start: 2024-12-11 | End: 2024-12-14 | Stop reason: HOSPADM

## 2024-12-11 RX ORDER — BISACODYL 10 MG
10 SUPPOSITORY, RECTAL RECTAL DAILY PRN
Status: DISCONTINUED | OUTPATIENT
Start: 2024-12-13 | End: 2024-12-14 | Stop reason: HOSPADM

## 2024-12-11 RX ORDER — CARBOPROST TROMETHAMINE 250 UG/ML
250 INJECTION, SOLUTION INTRAMUSCULAR
Status: DISCONTINUED | OUTPATIENT
Start: 2024-12-11 | End: 2024-12-11 | Stop reason: HOSPADM

## 2024-12-11 RX ORDER — OXYTOCIN 10 [USP'U]/ML
10 INJECTION, SOLUTION INTRAMUSCULAR; INTRAVENOUS
Status: DISCONTINUED | OUTPATIENT
Start: 2024-12-11 | End: 2024-12-11 | Stop reason: HOSPADM

## 2024-12-11 RX ORDER — METHYLERGONOVINE MALEATE 0.2 MG/ML
200 INJECTION INTRAVENOUS
Status: DISCONTINUED | OUTPATIENT
Start: 2024-12-11 | End: 2024-12-11 | Stop reason: HOSPADM

## 2024-12-11 RX ORDER — PROCHLORPERAZINE MALEATE 10 MG
10 TABLET ORAL EVERY 6 HOURS PRN
Status: DISCONTINUED | OUTPATIENT
Start: 2024-12-11 | End: 2024-12-14 | Stop reason: HOSPADM

## 2024-12-11 RX ORDER — LOPERAMIDE HYDROCHLORIDE 2 MG/1
2 CAPSULE ORAL
Status: DISCONTINUED | OUTPATIENT
Start: 2024-12-11 | End: 2024-12-14 | Stop reason: HOSPADM

## 2024-12-11 RX ORDER — CEFAZOLIN SODIUM/WATER 2 G/20 ML
2 SYRINGE (ML) INTRAVENOUS
Status: DISCONTINUED | OUTPATIENT
Start: 2024-12-11 | End: 2024-12-11 | Stop reason: HOSPADM

## 2024-12-11 RX ORDER — HYDROCORTISONE 25 MG/G
CREAM TOPICAL 3 TIMES DAILY PRN
Status: DISCONTINUED | OUTPATIENT
Start: 2024-12-11 | End: 2024-12-14 | Stop reason: HOSPADM

## 2024-12-11 RX ORDER — CEFAZOLIN SODIUM/WATER 2 G/20 ML
2 SYRINGE (ML) INTRAVENOUS SEE ADMIN INSTRUCTIONS
Status: DISCONTINUED | OUTPATIENT
Start: 2024-12-11 | End: 2024-12-11 | Stop reason: HOSPADM

## 2024-12-11 RX ORDER — ACETAMINOPHEN 325 MG/1
975 TABLET ORAL ONCE
Status: DISCONTINUED | OUTPATIENT
Start: 2024-12-11 | End: 2024-12-11 | Stop reason: HOSPADM

## 2024-12-11 RX ORDER — OXYTOCIN 10 [USP'U]/ML
10 INJECTION, SOLUTION INTRAMUSCULAR; INTRAVENOUS
Status: DISCONTINUED | OUTPATIENT
Start: 2024-12-11 | End: 2024-12-14 | Stop reason: HOSPADM

## 2024-12-11 RX ORDER — KETOROLAC TROMETHAMINE 30 MG/ML
30 INJECTION, SOLUTION INTRAMUSCULAR; INTRAVENOUS EVERY 6 HOURS
Status: COMPLETED | OUTPATIENT
Start: 2024-12-11 | End: 2024-12-12

## 2024-12-11 RX ORDER — TERBUTALINE SULFATE 1 MG/ML
0.25 INJECTION, SOLUTION SUBCUTANEOUS
Status: DISCONTINUED | OUTPATIENT
Start: 2024-12-11 | End: 2024-12-11 | Stop reason: HOSPADM

## 2024-12-11 RX ORDER — MISOPROSTOL 200 UG/1
400 TABLET ORAL
Status: DISCONTINUED | OUTPATIENT
Start: 2024-12-11 | End: 2024-12-14 | Stop reason: HOSPADM

## 2024-12-11 RX ORDER — LOPERAMIDE HYDROCHLORIDE 2 MG/1
4 CAPSULE ORAL
Status: DISCONTINUED | OUTPATIENT
Start: 2024-12-11 | End: 2024-12-11 | Stop reason: HOSPADM

## 2024-12-11 RX ORDER — IBUPROFEN 800 MG/1
800 TABLET, FILM COATED ORAL
Status: DISCONTINUED | OUTPATIENT
Start: 2024-12-11 | End: 2024-12-14 | Stop reason: HOSPADM

## 2024-12-11 RX ORDER — FENTANYL CITRATE 50 UG/ML
50 INJECTION, SOLUTION INTRAMUSCULAR; INTRAVENOUS EVERY 30 MIN PRN
Status: DISCONTINUED | OUTPATIENT
Start: 2024-12-11 | End: 2024-12-11 | Stop reason: HOSPADM

## 2024-12-11 RX ORDER — ONDANSETRON 2 MG/ML
4 INJECTION INTRAMUSCULAR; INTRAVENOUS EVERY 30 MIN PRN
Status: DISCONTINUED | OUTPATIENT
Start: 2024-12-11 | End: 2024-12-11 | Stop reason: HOSPADM

## 2024-12-11 RX ORDER — PENICILLIN G POTASSIUM 5000000 [IU]/1
5 INJECTION, POWDER, FOR SOLUTION INTRAMUSCULAR; INTRAVENOUS ONCE
Status: COMPLETED | OUTPATIENT
Start: 2024-12-11 | End: 2024-12-11

## 2024-12-11 RX ORDER — ONDANSETRON 4 MG/1
4 TABLET, ORALLY DISINTEGRATING ORAL EVERY 30 MIN PRN
Status: DISCONTINUED | OUTPATIENT
Start: 2024-12-11 | End: 2024-12-11 | Stop reason: HOSPADM

## 2024-12-11 RX ORDER — METOCLOPRAMIDE HYDROCHLORIDE 5 MG/ML
10 INJECTION INTRAMUSCULAR; INTRAVENOUS EVERY 6 HOURS PRN
Status: DISCONTINUED | OUTPATIENT
Start: 2024-12-11 | End: 2024-12-11 | Stop reason: HOSPADM

## 2024-12-11 RX ORDER — MISOPROSTOL 200 UG/1
800 TABLET ORAL
Status: DISCONTINUED | OUTPATIENT
Start: 2024-12-11 | End: 2024-12-14 | Stop reason: HOSPADM

## 2024-12-11 RX ORDER — NALOXONE HYDROCHLORIDE 0.4 MG/ML
0.2 INJECTION, SOLUTION INTRAMUSCULAR; INTRAVENOUS; SUBCUTANEOUS
Status: DISCONTINUED | OUTPATIENT
Start: 2024-12-11 | End: 2024-12-11 | Stop reason: HOSPADM

## 2024-12-11 RX ORDER — METHYLERGONOVINE MALEATE 0.2 MG/ML
200 INJECTION INTRAVENOUS
Status: DISCONTINUED | OUTPATIENT
Start: 2024-12-11 | End: 2024-12-14 | Stop reason: HOSPADM

## 2024-12-11 RX ORDER — NALBUPHINE HYDROCHLORIDE 20 MG/ML
2.5-5 INJECTION, SOLUTION INTRAMUSCULAR; INTRAVENOUS; SUBCUTANEOUS EVERY 6 HOURS PRN
Status: DISCONTINUED | OUTPATIENT
Start: 2024-12-11 | End: 2024-12-14 | Stop reason: HOSPADM

## 2024-12-11 RX ORDER — FENTANYL CITRATE-0.9 % NACL/PF 10 MCG/ML
100 PLASTIC BAG, INJECTION (ML) INTRAVENOUS EVERY 5 MIN PRN
Status: COMPLETED | OUTPATIENT
Start: 2024-12-11 | End: 2024-12-11

## 2024-12-11 RX ORDER — OXYCODONE HYDROCHLORIDE 5 MG/1
5 TABLET ORAL EVERY 4 HOURS PRN
Status: DISCONTINUED | OUTPATIENT
Start: 2024-12-11 | End: 2024-12-14 | Stop reason: HOSPADM

## 2024-12-11 RX ORDER — PROCHLORPERAZINE MALEATE 10 MG
10 TABLET ORAL EVERY 6 HOURS PRN
Status: DISCONTINUED | OUTPATIENT
Start: 2024-12-11 | End: 2024-12-11 | Stop reason: HOSPADM

## 2024-12-11 RX ORDER — SODIUM CHLORIDE, SODIUM LACTATE, POTASSIUM CHLORIDE, CALCIUM CHLORIDE 600; 310; 30; 20 MG/100ML; MG/100ML; MG/100ML; MG/100ML
INJECTION, SOLUTION INTRAVENOUS CONTINUOUS PRN
Status: DISCONTINUED | OUTPATIENT
Start: 2024-12-11 | End: 2024-12-11 | Stop reason: HOSPADM

## 2024-12-11 RX ORDER — ONDANSETRON 4 MG/1
4 TABLET, ORALLY DISINTEGRATING ORAL EVERY 6 HOURS PRN
Status: DISCONTINUED | OUTPATIENT
Start: 2024-12-11 | End: 2024-12-14 | Stop reason: HOSPADM

## 2024-12-11 RX ORDER — AMOXICILLIN 250 MG
1 CAPSULE ORAL 2 TIMES DAILY
Status: DISCONTINUED | OUTPATIENT
Start: 2024-12-11 | End: 2024-12-14 | Stop reason: HOSPADM

## 2024-12-11 RX ORDER — KETOROLAC TROMETHAMINE 30 MG/ML
30 INJECTION, SOLUTION INTRAMUSCULAR; INTRAVENOUS
Status: DISCONTINUED | OUTPATIENT
Start: 2024-12-11 | End: 2024-12-14 | Stop reason: HOSPADM

## 2024-12-11 RX ORDER — OXYTOCIN/0.9 % SODIUM CHLORIDE 30/500 ML
340 PLASTIC BAG, INJECTION (ML) INTRAVENOUS CONTINUOUS PRN
Status: DISCONTINUED | OUTPATIENT
Start: 2024-12-11 | End: 2024-12-11 | Stop reason: HOSPADM

## 2024-12-11 RX ORDER — OXYTOCIN/0.9 % SODIUM CHLORIDE 30/500 ML
340 PLASTIC BAG, INJECTION (ML) INTRAVENOUS CONTINUOUS PRN
Status: DISCONTINUED | OUTPATIENT
Start: 2024-12-11 | End: 2024-12-14 | Stop reason: HOSPADM

## 2024-12-11 RX ORDER — ACETAMINOPHEN 325 MG/1
975 TABLET ORAL EVERY 6 HOURS
Status: DISCONTINUED | OUTPATIENT
Start: 2024-12-11 | End: 2024-12-14 | Stop reason: HOSPADM

## 2024-12-11 RX ORDER — OXYTOCIN/0.9 % SODIUM CHLORIDE 30/500 ML
1-24 PLASTIC BAG, INJECTION (ML) INTRAVENOUS CONTINUOUS
Status: DISCONTINUED | OUTPATIENT
Start: 2024-12-11 | End: 2024-12-11 | Stop reason: HOSPADM

## 2024-12-11 RX ORDER — DEXTROSE, SODIUM CHLORIDE, SODIUM LACTATE, POTASSIUM CHLORIDE, AND CALCIUM CHLORIDE 5; .6; .31; .03; .02 G/100ML; G/100ML; G/100ML; G/100ML; G/100ML
INJECTION, SOLUTION INTRAVENOUS CONTINUOUS
Status: DISCONTINUED | OUTPATIENT
Start: 2024-12-11 | End: 2024-12-14 | Stop reason: HOSPADM

## 2024-12-11 RX ORDER — AMOXICILLIN 250 MG
2 CAPSULE ORAL 2 TIMES DAILY
Status: DISCONTINUED | OUTPATIENT
Start: 2024-12-11 | End: 2024-12-14 | Stop reason: HOSPADM

## 2024-12-11 RX ORDER — METOCLOPRAMIDE 10 MG/1
10 TABLET ORAL EVERY 6 HOURS PRN
Status: DISCONTINUED | OUTPATIENT
Start: 2024-12-11 | End: 2024-12-11 | Stop reason: HOSPADM

## 2024-12-11 RX ADMIN — PENICILLIN G POTASSIUM 5 MILLION UNITS: 5000000 POWDER, FOR SOLUTION INTRAMUSCULAR; INTRAPLEURAL; INTRATHECAL; INTRAVENOUS at 05:10

## 2024-12-11 RX ADMIN — Medication 100 MCG: at 14:51

## 2024-12-11 RX ADMIN — ACETAMINOPHEN 975 MG: 325 TABLET ORAL at 21:00

## 2024-12-11 RX ADMIN — Medication 100 MCG: at 14:38

## 2024-12-11 RX ADMIN — PENICILLIN G 3 MILLION UNITS: 3000000 INJECTION, SOLUTION INTRAVENOUS at 09:09

## 2024-12-11 RX ADMIN — Medication: at 14:27

## 2024-12-11 RX ADMIN — PENICILLIN G 3 MILLION UNITS: 3000000 INJECTION, SOLUTION INTRAVENOUS at 13:17

## 2024-12-11 RX ADMIN — METOCLOPRAMIDE HYDROCHLORIDE 10 MG: 5 INJECTION INTRAMUSCULAR; INTRAVENOUS at 18:14

## 2024-12-11 RX ADMIN — ONDANSETRON 4 MG: 2 INJECTION INTRAMUSCULAR; INTRAVENOUS at 21:15

## 2024-12-11 RX ADMIN — Medication 100 MCG: at 15:02

## 2024-12-11 RX ADMIN — FENTANYL CITRATE 50 MCG: 50 INJECTION, SOLUTION INTRAMUSCULAR; INTRAVENOUS at 12:05

## 2024-12-11 RX ADMIN — FENTANYL CITRATE 50 MCG: 50 INJECTION, SOLUTION INTRAMUSCULAR; INTRAVENOUS at 13:58

## 2024-12-11 RX ADMIN — FENTANYL CITRATE 50 MCG: 50 INJECTION, SOLUTION INTRAMUSCULAR; INTRAVENOUS at 12:25

## 2024-12-11 RX ADMIN — SENNOSIDES AND DOCUSATE SODIUM 1 TABLET: 8.6; 5 TABLET ORAL at 21:00

## 2024-12-11 RX ADMIN — SODIUM CHLORIDE, POTASSIUM CHLORIDE, SODIUM LACTATE AND CALCIUM CHLORIDE: 600; 310; 30; 20 INJECTION, SOLUTION INTRAVENOUS at 05:09

## 2024-12-11 RX ADMIN — FENTANYL CITRATE 50 MCG: 50 INJECTION, SOLUTION INTRAMUSCULAR; INTRAVENOUS at 13:12

## 2024-12-11 RX ADMIN — NALBUPHINE HYDROCHLORIDE 2.6 MG: 20 INJECTION, SOLUTION INTRAMUSCULAR; INTRAVENOUS; SUBCUTANEOUS at 21:03

## 2024-12-11 RX ADMIN — SODIUM CHLORIDE, POTASSIUM CHLORIDE, SODIUM LACTATE AND CALCIUM CHLORIDE 1000 ML: 600; 310; 30; 20 INJECTION, SOLUTION INTRAVENOUS at 14:13

## 2024-12-11 RX ADMIN — Medication 100 MCG: at 14:57

## 2024-12-11 RX ADMIN — Medication 4 MILLI-UNITS/MIN: at 10:38

## 2024-12-11 ASSESSMENT — ACTIVITIES OF DAILY LIVING (ADL)
ADLS_ACUITY_SCORE: 22
ADLS_ACUITY_SCORE: 22
ADLS_ACUITY_SCORE: 27
ADLS_ACUITY_SCORE: 22
ADLS_ACUITY_SCORE: 27
ADLS_ACUITY_SCORE: 22
ADLS_ACUITY_SCORE: 27
ADLS_ACUITY_SCORE: 22
ADLS_ACUITY_SCORE: 22
ADLS_ACUITY_SCORE: 23
ADLS_ACUITY_SCORE: 22
ADLS_ACUITY_SCORE: 27
ADLS_ACUITY_SCORE: 22
ADLS_ACUITY_SCORE: 22
ADLS_ACUITY_SCORE: 27
ADLS_ACUITY_SCORE: 22

## 2024-12-11 NOTE — PROGRESS NOTES
Progress Note    Now comfortable with epidural.     Temp:  [98  F (36.7  C)-98.2  F (36.8  C)] 98  F (36.7  C)  Pulse:  [85] 85  Resp:  [17] 17  BP: (100-107)/(67-73) 107/73    NAD, AAO x 3  Abdomen soft, non tender  Cervix: 9/C/+1    FHTs: 120s, Mod BTBV, + accels  [place]: Q2 minutes    A/P: 40 yo  at 37w 6d in labor  -- Now s/p epidural.   -- Anticipate     Vaishnavi Padilla MD, FACOG  P) [phone]

## 2024-12-11 NOTE — PROGRESS NOTES
Dr Padilla in unit and updated on patients status. POC for proceeding with abx at 0910 and then wait for OB to return from surgery to then begin pitocin augmentation.

## 2024-12-11 NOTE — PROGRESS NOTES
Data: Patient admitted to room 1 at 440. Patient is a . Prenatal record reviewed.   OB History    Para Term  AB Living   9 4 4 0 4 4   SAB IAB Ectopic Multiple Live Births   0 0 2 0 4      # Outcome Date GA Lbr Dony/2nd Weight Sex Type Anes PTL Lv   9 Current            8 AB 10/05/23 18w5d  0.249 kg (8.8 oz) M Vag-Spont IV  FD      Name: BINH,PENDING BABY PA FD      Apgar1: 0  Apgar5: 0   7 Term 22 37w3d / 00:26 2.91 kg (6 lb 6.7 oz) F Vag-Spont IV N SEKOU      Name: BINH,FEMALE-PA      Apgar1: 9  Apgar5: 9   6 AB 20 19w4d  0.1 kg (3.5 oz) M  None  FD      Name: BINHBABY-PA FD      Apgar1: 0  Apgar5: 0   5 Ectopic      ECTOPIC         Birth Comments: left salpingectomy   4 Ectopic      ECTOPIC         Birth Comments: right salpingectomy   3 Term 10/29/07   2.92 kg (6 lb 7 oz) M Vag-Spont   SEKOU   2 Term 01/10/05   2.892 kg (6 lb 6 oz) F Vag-Spont   SEKOU   1 Term 03   2.892 kg (6 lb 6 oz) M Vag-Spont   SEKOU   .  Medical History:   Past Medical History:   Diagnosis Date    Anemia     Liver disease     Miscarriage     Single liveborn infant delivered vaginally 2022    Thyroid disease    .  Gestational age 37w6d. Vital signs per doc flowsheet. Fetal movement present. Patient reports Spontaneous Rupture of Membrane (SROM 4am)   as reason for admission. Support persons Maulik present.  Verbal consent for EFM, external fetal monitors applied. Admission assessment completed. Patient and support persons educated on labor process. Patient instructed to report change in fetal movement, contractions, vaginal leaking of fluid or bleeding, abdominal pain, or any concerns related to the pregnancy to her nurse/physician. Patient oriented to room, call light in reach.   Response: Dr. Sanford informed of arrival and SROM. Plan per provider is  start antibiotics and pitocin if needed after adequate GBS treatment . Patient verbalized understanding of education and verbalized agreement with  plan. Patient coping with labor via well. .

## 2024-12-11 NOTE — OP NOTE
Section Operative Report    Preoperative Diagnosis:   Nonreassuring fetal heart tones  Failed vacuum    Postoperative Diagnosis:  Nonreassuring fetal heart tones  Failed vacuum    Procedure: Primary Low transverse  section with two layer closure    Uterine Extensions: None    Surgeon:  Vaishnavi Padilla MD     Assistant: Dr. Sorto    Anesthesia: epidural    Delivery QBL (mL): 547 cc    Uterine Atony: None    Findings:   1. Amniotic fluid - Clear  2. Cephalic presentation  3. Normal uterus and right tube and ovary. Left tube absent. Bowel adherent to the left side of the uterus.   4. Live female infant  5. Apgars of 2 at one minute, 9 at 5 minutes and 9 at 10 minutes  6. Weight - 7 IBS 8.3 oz  7. Normal appearing placenta  8. Good hemostasis  9. Counts correct x 2  10.  Omental adhesion to the anterior abdominal wall.    Drains: Salinas catheter.    Pathology: None    Complications: None    Procedure:    Patient was met preoperatively where we discussed the procedure and the risks associated with the procedure.  She understood these to include but not limited to injury to adjacent organs including bowel, bladder, ureter, infection and bleeding. Verbal consent was obtained.     She was brought to the operating room in stable condition.  After bolusing the epidural fetal heart tones were checked and were stable. A salinas catheter was placed. A fetal pillow was placed. She was carefully prepped and draped in the typical sterile fashion for the procedure.  A timeout was then performed.      A Pfannenstiel skin incision was made and carried down to the rectus fascia which was incised in the midline and carried out bilaterally.  The superior and inferior aspects of the rectus fascia were elevated up and the underlying rectus muscles dissected off with sharp and blunt techniques.  The rectus muscles were then  at the midline and the peritoneum was identified and entered bluntly. This incision  was extended. There was an omental adhession to the anterior abdominal wall. This was taken down with a bovie.  A bladder blade was introduced. The vesicouterine peritoneum was identified and a bladder flap was created.  A low transverse uterine incision was made revealing clear amniotic fluid.  The baby's head was then delivered. The remainder of the infant easily delivered. The cord was clamped x 2 and cut and the infant handed off to waiting nursing personnel. Cord was clamped for gasses. The fetal pillow was reduced.     The placenta was then manually removed from the uterus.  The uterus was exteriorized, covered with a moist laparotomy sponge and cleared of all clots and debris.  There was colon adherent to the left side of the uterus near the cornua and the left ovary.  The left tube was absent.  The uterine incision was then closed with #1-chromic from both angles in a running locking fashion. A second imbricating layer was placed with #1 chromic. The incision was irrigated and noted to be hemostatic. The bladder flap was then reapproximated with 2-0 vicryl. The uterus was returned to the abdominopelvic cavity.  The pericolic gutters were cleared of all clots and debris.  The uterine incision was again inspected and noted to be hemostatic.  Expecting.  The rectus muscles were made hemostatic with the use of electrocautery and brought together with vertical mattress sutures of 2.0 chromic. The fascia was brought together with 0 Stratafix suture. The subcutaneous tissues were irrigated, made hemostatic with use of electrocautery and brought together with 3-0 plain gut suture.  Skin was closed with 4.0 Stratafix suture.  Patient tolerated this procedure well.  Needle, instrument and lap counts were correct x two. Steri strips were placed. Dressing was placed. The fetal pillow was removed from the vagina.     There were no surgical or anesthetic complications.   Arterial pH was 7.23.     Vaishnavi Padilla MD

## 2024-12-11 NOTE — PLAN OF CARE
"  Problem: Adult Inpatient Plan of Care  Goal: Plan of Care Review  Description: The Plan of Care Review/Shift note should be completed every shift.  The Outcome Evaluation is a brief statement about your assessment that the patient is improving, declining, or no change.  This information will be displayed automatically on your shift  note.  Outcome: Progressing     Problem: Adult Inpatient Plan of Care  Goal: Patient-Specific Goal (Individualized)  Description: You can add care plan individualizations to a care plan. Examples of Individualization might be:  \"Parent requests to be called daily at 9am for status\", \"I have a hard time hearing out of my right ear\", or \"Do not touch me to wake me up as it startles  me\".  Outcome: Progressing   Goal Outcome Evaluation:             Patient will continue to progress in normal labor pattern           "

## 2024-12-11 NOTE — H&P
[facility]    History and Physical       Date of Admission:  2024    History of Present Illness   Gwyn Ibarra is a 39 year old female  37w6d  Estimated Date of Delivery: Dec 26, 2024 is calculated from No LMP recorded. Patient is pregnant. is [facility] with SROM.     Prenatal course was complicated by:  - mild polyhydramnios (CHELSEA 24)    - prediabetes (A1c 5.7, nl GTT)  - IVF pregnancy  - hypothyroidism  - hepatitis B carrier  - h/o IUFD x 2 in 2nd trimester    Past Medical History    Past Medical History:   Diagnosis Date    Anemia     Liver disease     Miscarriage     Single liveborn infant delivered vaginally 2022    Thyroid disease        Past Surgical History   Past Surgical History:   Procedure Laterality Date    LAPAROSCOPY FOR ECTOPIC PREGNANCY Right 10/27/2010    LAPAROSCOPY FOR ECTOPIC PREGNANCY Left 2012       OB History    Para Term  AB Living   9 4 4 0 4 4   SAB IAB Ectopic Multiple Live Births   0 0 2 0 4      # Outcome Date GA Lbr Dony/2nd Weight Sex Type Anes PTL Lv   9 Current            8 AB 10/05/23 18w5d  0.249 kg (8.8 oz) M Vag-Spont IV  FD      Name: BINH,PENDING BABY PA FD      Apgar1: 0  Apgar5: 0   7 Term 22 37w3d / 00:26 2.91 kg (6 lb 6.7 oz) F Vag-Spont IV N SEKOU      Name: BINHFEMALE-PA      Apgar1: 9  Apgar5: 9   6 AB 20 19w4d  0.1 kg (3.5 oz) M  None  FD      Name: BINHBABY-PA FD      Apgar1: 0  Apgar5: 0   5 Ectopic      ECTOPIC         Birth Comments: left salpingectomy   4 Ectopic      ECTOPIC         Birth Comments: right salpingectomy   3 Term 10/29/07   2.92 kg (6 lb 7 oz) M Vag-Spont   SEKOU   2 Term 01/10/05   2.892 kg (6 lb 6 oz) F Vag-Spont   SEKOU   1 Term 03   2.892 kg (6 lb 6 oz) M Vag-Spont   SEKOU      Social History   Social History     Tobacco Use    Smoking status: Never    Smokeless tobacco: Never   Substance Use Topics    Alcohol use: Not Currently    Drug use: Never       Family History   History reviewed. No pertinent family history.     Prior to Admission Medications   Prior to Admission Medications   Prescriptions Last Dose Informant Patient Reported? Taking?   FEROSUL 325 (65 Fe) MG tablet   Yes No   Sig: Take 1 tablet by mouth daily at 2 pm.   Prenatal Vit-Fe Fumarate-FA (PRENATAL MULTIVITAMIN W/IRON) 27-0.8 MG tablet   Yes No   Sig: Take 1 tablet by mouth daily   docusate sodium (COLACE) 50 MG capsule   Yes No   Sig: Take 50 mg by mouth 2 times daily.   doxylamine (UNISOM) 25 MG TABS tablet   Yes No   Sig: Take 12.5 mg by mouth At Bedtime As needed if unable to fall asleep.   levothyroxine (SYNTHROID/LEVOTHROID) 25 MCG tablet   Yes No   Sig: Take 25 mcg by mouth daily      Facility-Administered Medications: None     Allergies   No Known Allergies    Physical Exam   Vital Signs with Ranges  Pulse:  [85] 85  BP: (100)/(67) 100/67    Gen: no acute distress, resting comfortably   CV: acyanotic   Heart: regular rate and rhythm   Pulm: unlabored respirations, clear to ausculation bilaterally    Abd: gravid, soft, nontender   Extremities: soft, nontender     Recent Labs   Lab Test 24  1506   AS Negative     Recent Labs   Lab Test 24  1506 23  1636 22  0000   HEPBANG Reactive*   < >  --    HIAGAB Nonreactive   < >  --    GBS  --   --  Positive*   RUQIGG Positive   < >  --     < > = values in this interval not displayed.       Fetal Heart Tones: 140 baseline, moderate variablility, + accels, no decels, and Category I  TOCO:   frequency q 8 minutes  SVE: 7 cm per RN    Bedside ultrasound: vertex      Assessment & Plan   Pa OSCAR Ibarra is a 39 year old female who presents with SROM  IUP @ 37w6d .  NST reactive.  Category  I    PLAN:   Admit - see IP orders  Pain medication - plans IV pain meds prn  Prophylactic antibiotic for + GBS status  Anticipate   Rhogam not indicated   She is a hepatitis B carrier so baby will need hepatitis B vaccine series and  HBIG    Marilyn Sanford MD

## 2024-12-11 NOTE — PROGRESS NOTES
Progress Note    Late decelerations present.  Discussed with Pat attempted a vacuum-assisted delivery.  Discussed risks and benefits of vacuum delivery.  I discussed risk of hematoma laceration and intracranial bleeding.  Verbal consent was obtained.  The flat Kiwi was placed.  Suction was obtained in the green zone.  I pulled 2 times with some dissent.  I reduced the vacuum and replaced the cup.  I pulled 1 more time with some dissent.  However, delivery was not imminent.  I recommended we proceed with a primary . Total vacuum time was 180 seconds. Risks and benefits were reviewed.  Stat C-section called.  I pulled 3 times.  There were no pop offs.    Vaishnavi Padilla MD, FACOG  P) [phone]

## 2024-12-11 NOTE — PROGRESS NOTES
1144: Dr Padilla in to assess cervix. OB remained either at bedside or in unit observing fetal tracing and frequently updated on patients status. See doc flow for SVEs, progress, and Provider at bedside throughout pushing and vacuum. Decision to incision 15minutes due to failed vacuum. Dr Padilla verbally consented patient to  section  and patient verbally consented to blood if needed. See OB noted for further details.

## 2024-12-11 NOTE — PROGRESS NOTES
Bedside report done with previous RN and patient. Discussed POC for getting 2nd dose of Abx in due to GBS+ and then will start pitocin per OB orders. Patient verbalized understanding, stated feels occasional contractions that are not intense at this time and able to rest. POC to rest until 0910 when Abx will be started followed by pitocin to augment labor. Primary OB will be contacted this AM to see if desiring to cover vs IHOB. Patient verbalized understanding, questions and concerns answered.

## 2024-12-11 NOTE — PROGRESS NOTES
In to initiate PCN. Patient sleeping comfortably. Discussed POC for ABX and then when Dr Padilla returns will then initiate pitocin. Patient verbalized understanding.

## 2024-12-12 VITALS
SYSTOLIC BLOOD PRESSURE: 85 MMHG | RESPIRATION RATE: 18 BRPM | TEMPERATURE: 97.8 F | DIASTOLIC BLOOD PRESSURE: 54 MMHG | HEART RATE: 97 BPM | HEIGHT: 57 IN | WEIGHT: 133 LBS | OXYGEN SATURATION: 97 % | BODY MASS INDEX: 28.69 KG/M2

## 2024-12-12 LAB
HGB BLD-MCNC: 9 G/DL (ref 11.7–15.7)
HGB BLD-MCNC: 9.2 G/DL (ref 11.7–15.7)

## 2024-12-12 PROCEDURE — 250N000011 HC RX IP 250 OP 636: Performed by: OBSTETRICS & GYNECOLOGY

## 2024-12-12 PROCEDURE — 250N000009 HC RX 250: Performed by: OBSTETRICS & GYNECOLOGY

## 2024-12-12 PROCEDURE — 85018 HEMOGLOBIN: CPT | Performed by: OBSTETRICS & GYNECOLOGY

## 2024-12-12 PROCEDURE — 120N000001 HC R&B MED SURG/OB

## 2024-12-12 PROCEDURE — 250N000013 HC RX MED GY IP 250 OP 250 PS 637: Performed by: OBSTETRICS & GYNECOLOGY

## 2024-12-12 PROCEDURE — 258N000003 HC RX IP 258 OP 636: Performed by: OBSTETRICS & GYNECOLOGY

## 2024-12-12 PROCEDURE — 36415 COLL VENOUS BLD VENIPUNCTURE: CPT | Performed by: OBSTETRICS & GYNECOLOGY

## 2024-12-12 PROCEDURE — 258N000003 HC RX IP 258 OP 636

## 2024-12-12 RX ORDER — ACETAMINOPHEN 325 MG/1
975 TABLET ORAL EVERY 6 HOURS PRN
COMMUNITY
Start: 2024-12-12

## 2024-12-12 RX ORDER — OXYCODONE HYDROCHLORIDE 5 MG/1
5 TABLET ORAL EVERY 4 HOURS PRN
Qty: 10 TABLET | Refills: 0 | Status: SHIPPED | OUTPATIENT
Start: 2024-12-12

## 2024-12-12 RX ORDER — IBUPROFEN 200 MG
800 TABLET ORAL EVERY 8 HOURS PRN
COMMUNITY
Start: 2024-12-12

## 2024-12-12 RX ORDER — AMOXICILLIN 250 MG
1 CAPSULE ORAL 2 TIMES DAILY PRN
COMMUNITY
Start: 2024-12-12

## 2024-12-12 RX ADMIN — SODIUM CHLORIDE 500 ML: 9 INJECTION, SOLUTION INTRAVENOUS at 12:31

## 2024-12-12 RX ADMIN — ACETAMINOPHEN 975 MG: 325 TABLET ORAL at 17:37

## 2024-12-12 RX ADMIN — IBUPROFEN 800 MG: 800 TABLET ORAL at 17:37

## 2024-12-12 RX ADMIN — SENNOSIDES AND DOCUSATE SODIUM 1 TABLET: 8.6; 5 TABLET ORAL at 19:49

## 2024-12-12 RX ADMIN — ACETAMINOPHEN 975 MG: 325 TABLET ORAL at 11:36

## 2024-12-12 RX ADMIN — IBUPROFEN 800 MG: 800 TABLET ORAL at 23:05

## 2024-12-12 RX ADMIN — OXYCODONE HYDROCHLORIDE 5 MG: 5 TABLET ORAL at 21:11

## 2024-12-12 RX ADMIN — EPSOM SALT: 1 GRANULE ORAL; TOPICAL at 12:31

## 2024-12-12 RX ADMIN — SENNOSIDES AND DOCUSATE SODIUM 1 TABLET: 8.6; 5 TABLET ORAL at 11:15

## 2024-12-12 RX ADMIN — KETOROLAC TROMETHAMINE 30 MG: 30 INJECTION, SOLUTION INTRAMUSCULAR at 11:14

## 2024-12-12 RX ADMIN — SODIUM CHLORIDE, POTASSIUM CHLORIDE, SODIUM LACTATE AND CALCIUM CHLORIDE 500 ML: 600; 310; 30; 20 INJECTION, SOLUTION INTRAVENOUS at 03:56

## 2024-12-12 RX ADMIN — ACETAMINOPHEN 975 MG: 325 TABLET ORAL at 23:05

## 2024-12-12 RX ADMIN — METOCLOPRAMIDE HYDROCHLORIDE 10 MG: 5 INJECTION INTRAMUSCULAR; INTRAVENOUS at 00:39

## 2024-12-12 RX ADMIN — OXYCODONE HYDROCHLORIDE 5 MG: 5 TABLET ORAL at 15:04

## 2024-12-12 ASSESSMENT — ACTIVITIES OF DAILY LIVING (ADL)
ADLS_ACUITY_SCORE: 25
ADLS_ACUITY_SCORE: 26
ADLS_ACUITY_SCORE: 24
ADLS_ACUITY_SCORE: 24
ADLS_ACUITY_SCORE: 26
ADLS_ACUITY_SCORE: 24
ADLS_ACUITY_SCORE: 26
ADLS_ACUITY_SCORE: 24
ADLS_ACUITY_SCORE: 24
ADLS_ACUITY_SCORE: 27
ADLS_ACUITY_SCORE: 24
ADLS_ACUITY_SCORE: 27
ADLS_ACUITY_SCORE: 24
ADLS_ACUITY_SCORE: 26
ADLS_ACUITY_SCORE: 27
ADLS_ACUITY_SCORE: 24
ADLS_ACUITY_SCORE: 27
ADLS_ACUITY_SCORE: 24
ADLS_ACUITY_SCORE: 24

## 2024-12-12 NOTE — PLAN OF CARE
Goal Outcome Evaluation:      Plan of Care Reviewed With: patient, significant other    Overall Patient Progress: improvingOverall Patient Progress: improving    Outcome Evaluation: Vitals and assessments within normal limits, except nausea and dizzy with activity. Cummins removed at 0100, voided once 150 mLs, got 500 mL LR bolus, encouraged oral hydration. Denies pain. Gave nubain once for itching. Not breastfeeding. Bonding appropriate with . Significant other at bedside.    Temp: 98.1  F (36.7  C) Temp src: Oral BP: 93/56 Pulse: 77   Resp: 16 SpO2: 94 % O2 Device: None (Room air)      Problem: Postpartum ( Delivery)  Goal: Effective Urinary Elimination  Outcome: Not Progressing   Cummins removed, waiting to pass void trials.     Problem: Postpartum ( Delivery)  Goal: Hemostasis  Outcome: Progressing     Problem: Postpartum ( Delivery)  Goal: Optimal Pain Control and Function  Outcome: Progressing     Problem: Postpartum ( Delivery)  Goal: Nausea and Vomiting Relief  Outcome: Progressing   Gave Reglan and Zofran for nausea.   independent

## 2024-12-12 NOTE — PROVIDER NOTIFICATION
MDA notified for clarification of blood pressure goals. Keep MAPs between 60-70. Patient met criteria with PACU observation, did not need to start phenylephrine gtt.

## 2024-12-12 NOTE — PROVIDER NOTIFICATION
Notified NP at 1215 PM regarding changes in vital signs.      Spoke with: Jessica Moritz, NP, ok to give 500ml bolus of normal saline x1    Orders were obtained.    Comments: Discussed most recent BP of 89/61, mom up and eating in bed. Having dizziness when up and moving that goes away quickly.

## 2024-12-12 NOTE — PROVIDER NOTIFICATION
Dr. Barboza at bedside to evaluate  Mepilex that became wet in the shower. Updated on BP's 75/48 and 79/49.     Per Dr. Barboza, ok to recheck at 1830 and to place orders for stat Hemoglobin.    Orders placed.

## 2024-12-12 NOTE — PROGRESS NOTES
Data: Gwyn Ibarra transferred to [facility]/[id number] via bed. Patient transferred to Postpartum with .    Action: Receiving unit notified of transfer. Patient and support person notified of room change. Patient and belongings accompanied by Registered Nurse. Bedside report given to Dianne Fuller. Fundal check performed with receiving RN. Oriented patient to surroundings. Call light within reach.    Response: Patient tolerated transfer.    Rina Smith RN  2024 8:58 PM

## 2024-12-12 NOTE — PROGRESS NOTES
Progress Note    Doing well. Pain controlled. Had nausea and threw up earlier. Now improved. Tolerating liquids. Voiding.     Temp:  [97  F (36.1  C)-98.3  F (36.8  C)] 97.8  F (36.6  C)  Pulse:  [] 77  Resp:  [14-22] 14  BP: ()/(49-73) 94/61  SpO2:  [89 %-100 %] 95 %    NAD, AAO x 3  Abdomen soft, moderate distention. Dressing clean and dry. Appropriate post op discomfort.   No C/C/E    Hgb: 10.9 --> 9    A/P: 40 yo POD #1 s/p primary C/S for non reassuring FHTs and failed vacuum   -- Routine post op care    Vaishnavi Padilla MD, FACOG  (p) [phone]

## 2024-12-12 NOTE — PROVIDER NOTIFICATION
Notified MD at 0805 AM regarding new orders.      Comments: Patient swollen in labia area, added ice packs and tucks to bottom, can we have orders for mag packs.    Spoke with: layla Bray to order mag packs    Orders were obtained.

## 2024-12-13 PROCEDURE — 250N000011 HC RX IP 250 OP 636: Performed by: OBSTETRICS & GYNECOLOGY

## 2024-12-13 PROCEDURE — 250N000013 HC RX MED GY IP 250 OP 250 PS 637: Performed by: OBSTETRICS & GYNECOLOGY

## 2024-12-13 PROCEDURE — 120N000001 HC R&B MED SURG/OB

## 2024-12-13 RX ADMIN — IBUPROFEN 800 MG: 800 TABLET ORAL at 17:39

## 2024-12-13 RX ADMIN — OXYCODONE HYDROCHLORIDE 5 MG: 5 TABLET ORAL at 12:07

## 2024-12-13 RX ADMIN — METOCLOPRAMIDE HYDROCHLORIDE 10 MG: 5 INJECTION INTRAMUSCULAR; INTRAVENOUS at 19:23

## 2024-12-13 RX ADMIN — ACETAMINOPHEN 975 MG: 325 TABLET ORAL at 17:39

## 2024-12-13 RX ADMIN — OXYCODONE HYDROCHLORIDE 5 MG: 5 TABLET ORAL at 07:58

## 2024-12-13 RX ADMIN — ACETAMINOPHEN 975 MG: 325 TABLET ORAL at 12:07

## 2024-12-13 RX ADMIN — IBUPROFEN 800 MG: 800 TABLET ORAL at 12:07

## 2024-12-13 RX ADMIN — IBUPROFEN 800 MG: 800 TABLET ORAL at 05:02

## 2024-12-13 RX ADMIN — SENNOSIDES AND DOCUSATE SODIUM 1 TABLET: 8.6; 5 TABLET ORAL at 20:48

## 2024-12-13 RX ADMIN — OXYCODONE HYDROCHLORIDE 5 MG: 5 TABLET ORAL at 17:39

## 2024-12-13 RX ADMIN — ACETAMINOPHEN 975 MG: 325 TABLET ORAL at 05:02

## 2024-12-13 RX ADMIN — SENNOSIDES, DOCUSATE SODIUM 2 TABLET: 50; 8.6 TABLET, FILM COATED ORAL at 07:58

## 2024-12-13 RX ADMIN — ACETAMINOPHEN 975 MG: 325 TABLET ORAL at 23:48

## 2024-12-13 ASSESSMENT — ACTIVITIES OF DAILY LIVING (ADL)
ADLS_ACUITY_SCORE: 24
ADLS_ACUITY_SCORE: 28
ADLS_ACUITY_SCORE: 24
ADLS_ACUITY_SCORE: 28
ADLS_ACUITY_SCORE: 24
ADLS_ACUITY_SCORE: 24
ADLS_ACUITY_SCORE: 28
ADLS_ACUITY_SCORE: 24
ADLS_ACUITY_SCORE: 28
ADLS_ACUITY_SCORE: 24
ADLS_ACUITY_SCORE: 28
ADLS_ACUITY_SCORE: 24

## 2024-12-13 NOTE — PLAN OF CARE
Patient fundus is firm at -1. Patient is ambulating independently and voiding without difficulty. Pain is being managed with ibuprofen, Tylenol, and oxycodone for pain management. Cold packs are being utilized for comfort.    Shara Mcmullen RN  12/13/2024

## 2024-12-13 NOTE — PLAN OF CARE
Goal Outcome Evaluation:      Plan of Care Reviewed With: patient, spouse    Overall Patient Progress: improvingOverall Patient Progress: improving      Problem: Postpartum ( Delivery)  Goal: Fluid and Electrolyte Balance  Outcome: Progressing     Hypotensive throughout shift, all other vitals stable. Started shift with some dizziness when getting out of bed, this has resolved. 500 ml bolus of NS given due to softer BP's around noon. Voiding adequately, bleeding light, uterus firm. Perineum swollen, sitz bath, tucks, mag packs, and ice used with some relief. Tylenol,Toradol/Ibuprofen, Oxycodone and heat given for pain, with relief. Education given to patient on taking Toradol/Ibuprofen and Tylenol  first before taking Oxycodone. Bath and shower on shift, Mepilex dressing saturated, incision is open to air with steri strips. May replace tomorrow. Hemoglobin 9.2.

## 2024-12-13 NOTE — DISCHARGE INSTRUCTIONS
Warning Signs after Having a Baby    Keep this paper on your fridge or somewhere else where you can see it.    Call your provider if you have any of these symptoms up to 12 weeks after having your baby.    Thoughts of hurting yourself or your baby  Pain in your chest or trouble breathing  Severe headache not helped by pain medicine  Eyesight concerns (blurry vision, seeing spots or flashes of light, other changes to eyesight)  Fainting, shaking or other signs of a seizure    Call 9-1-1 if you feel that it is an emergency.     The symptoms below can happen to anyone after giving birth. They can be very serious. Call your provider if you have any of these warning signs.    My provider s phone number: _______________________    Losing too much blood (hemorrhage)    Call your provider if you soak through a pad in less than an hour or pass blood clots bigger than a golf ball. These may be signs that you are bleeding too much.    Blood clots in the legs or lungs    After you give birth, your body naturally clots its blood to help prevent blood loss. Sometimes this increased clotting can happen in other areas of the body, like the legs or lungs. This can block your blood flow and be very dangerous.     Call your provider if you:  Have a red, swollen spot on the back of your leg that is warm or painful when you touch it.   Are coughing up blood.     Infection    Call your provider if you have any of these symptoms:  Fever of 100.4 F (38 C) or higher.  Pain or redness around your stitches if you had an incision.   Any yellow, white, or green fluid coming from places where you had stitches or surgery.    Mood Problems (postpartum depression)    Many people feel sad or have mood changes after having a baby. But for some people, these mood swings are worse.     Call your provider right away if you feel so anxious or nervous that you can't care for yourself or your baby.    Preeclampsia (high blood pressure)    Even if you  didn't have high blood pressure when you were pregnant, you are at risk for the high blood pressure disease called preeclampsia. This risk can last up to 12 weeks after giving birth.     Call your provider if you have:   Pain on your right side under your rib cage  Sudden swelling in the hands and face    Remember: You know your body. If something doesn't feel right, get medical help.     For informational purposes only. Not to replace the advice of your health care provider. Copyright 2020 [facility]. All rights reserved. Clinically reviewed by Nadege Davis, RNC-OB, MSN. Webjam 307928 - Rev 02/23.

## 2024-12-13 NOTE — PLAN OF CARE
Goal Outcome Evaluation:      Plan of Care Reviewed With: patient, significant other    Overall Patient Progress: improvingOverall Patient Progress: improving    Outcome Evaluation: Soft blood pressures, asymptomatic. Assessments within normal limits.  incision WDL, new dressing applied by patient. Moderate to high pain, using tylenol, ibuprofen, and oxycodone. Voiding spontaneously. Bonding appropriate with .    Temp: 97.8  F (36.6  C) Temp src: Oral BP: 90/57 Pulse: 87   Resp: 18 SpO2: 97 % O2 Device: None (Room air)      Problem: Postpartum ( Delivery)  Goal: Hemostasis  Outcome: Progressing     Problem: Postpartum ( Delivery)  Goal: Fluid and Electrolyte Balance  Outcome: Progressing     Problem: Postpartum ( Delivery)  Goal: Optimal Pain Control and Function  Outcome: Progressing  Intervention: Prevent or Manage Pain  Recent Flowsheet Documentation  Taken 2024 by Dianne Cardona RN  Pain Management Interventions:   distraction   rest  Taken 2024 by Dianne Cardona RN  Pain Management Interventions: medication (see MAR)

## 2024-12-13 NOTE — PLAN OF CARE
Problem: Adult Inpatient Plan of Care  Goal: Plan of Care Review  Description: The Plan of Care Review/Shift note should be completed every shift.  The Outcome Evaluation is a brief statement about your assessment that the patient is improving, declining, or no change.  This information will be displayed automatically on your shift  note.  12/13/2024 1210 by Shara Mcmullen RN  Outcome: Progressing  12/13/2024 0853 by Shara Mcmullen RN  Outcome: Progressing   Goal Outcome Evaluation: progressing               Patient fundus is firm at -1. Patient is ambulating independently and voiding without difficulty. Pain is being managed with ibuprofen, Tylenol, and oxycodone for pain management. Cold packs are being utilized for comfort. Patient was hypotensive but we are monitoring for orthostasis and patient denies dizziness.    Shara Mcmullen RN  12/13/2024

## 2024-12-13 NOTE — PROGRESS NOTES
[facility] Labor and Delivery Progress Note    Gwyn Ibarra MRN# [id number]   Age: 39 year old YOB: 1985           Subjective:   Patient up to shower, states dizziness improved,managing pain,   Called to room as dressing saturated in shower and RN noted low BPs           Objective:   Patient Vitals for the past 24 hrs:   BP Temp Temp src Pulse Resp SpO2 Oximeter Heart Rate   12/12/24 1732 (!) 79/49 -- -- -- -- -- --   12/12/24 1731 (!) 75/48 98.1  F (36.7  C) -- 91 24 97 % --   12/12/24 1135 (!) 89/61 98.8  F (37.1  C) Oral 86 24 95 % --   12/12/24 0954 -- -- -- -- 18 -- --   12/12/24 0700 94/61 97.8  F (36.6  C) Oral 77 14 95 % --   12/12/24 0605 -- -- -- 72 16 94 % --   12/12/24 0500 -- -- -- 80 16 94 % --   12/12/24 0402 -- -- -- 76 14 94 % --   12/12/24 0304 (!) 87/57 98.2  F (36.8  C) Oral 80 16 95 % --   12/12/24 0200 -- -- -- 76 16 94 % --   12/12/24 0047 -- -- -- 77 16 94 % --   12/11/24 2318 -- -- -- -- -- 95 % --   12/11/24 2300 93/56 98.1  F (36.7  C) Oral 75 16 94 % --   12/11/24 2200 93/54 98.3  F (36.8  C) Oral 81 16 95 % --   12/11/24 2100 99/61 97.7  F (36.5  C) Oral 107 22 98 % --   12/11/24 2000 115/73 -- -- -- -- -- 92 bpm   12/11/24 1959 -- -- -- -- -- 97 % 89 bpm   12/11/24 1954 -- -- -- -- -- 97 % 87 bpm   12/11/24 1949 -- -- -- -- -- 97 % 83 bpm   12/11/24 1944 -- -- -- -- -- 99 % 92 bpm   12/11/24 1939 -- -- -- -- -- 99 % 94 bpm   12/11/24 1937 -- -- -- -- -- 91 % 98 bpm   12/11/24 1934 -- -- -- -- -- 97 % 84 bpm   12/11/24 1931 100/63 -- -- -- -- (!) 89 % 85 bpm   12/11/24 1929 -- -- -- -- -- 98 % 82 bpm   12/11/24 1924 -- -- -- -- -- 99 % 91 bpm   12/11/24 1919 -- -- -- -- -- 98 % 82 bpm   12/11/24 1914 -- -- -- -- -- 98 % 87 bpm   12/11/24 1909 -- -- -- -- -- 100 % 86 bpm   12/11/24 1904 -- -- -- -- -- 99 % 83 bpm   12/11/24 1900 104/67 -- -- -- -- -- 83 bpm   12/11/24 1859 -- -- -- -- -- 100 % 87 bpm   12/11/24 1854 -- -- -- -- -- 100 % 92 bpm   12/11/24 1849 -- -- -- --  -- 100 % 88 bpm   24 -- -- -- -- -- 100 % 89 bpm   24 -- -- -- -- -- 100 % 83 bpm   24 -- -- -- -- -- 100 % 86 bpm   24 104/62 97.6  F (36.4  C) -- -- -- -- 80 bpm     Alert, O x 3  CV regular  Resp non labored  Ab soft, approp tender, bandage on left saturated with water, no active bleeding or purulence  Removed and steris in place, covered for now with dry washcloth        Assessment:   Gwyn MOORE Stacey is a 39 year old  who is POD # 1 s/p  with acute blood loss anemia and hypotension, asymptomatic          Plan:   OK to leave incision open to air at this time, repeat HB, continue to follow for signs or symptoms of orthostasis      Vaishnavi Barboza MD

## 2024-12-14 VITALS
HEART RATE: 80 BPM | RESPIRATION RATE: 18 BRPM | HEIGHT: 57 IN | BODY MASS INDEX: 27.57 KG/M2 | WEIGHT: 127.8 LBS | DIASTOLIC BLOOD PRESSURE: 61 MMHG | TEMPERATURE: 98.1 F | OXYGEN SATURATION: 98 % | SYSTOLIC BLOOD PRESSURE: 101 MMHG

## 2024-12-14 PROCEDURE — 250N000013 HC RX MED GY IP 250 OP 250 PS 637: Performed by: OBSTETRICS & GYNECOLOGY

## 2024-12-14 RX ADMIN — IBUPROFEN 800 MG: 800 TABLET ORAL at 11:52

## 2024-12-14 RX ADMIN — OXYCODONE HYDROCHLORIDE 5 MG: 5 TABLET ORAL at 14:29

## 2024-12-14 RX ADMIN — OXYCODONE HYDROCHLORIDE 5 MG: 5 TABLET ORAL at 00:14

## 2024-12-14 RX ADMIN — OXYCODONE HYDROCHLORIDE 5 MG: 5 TABLET ORAL at 09:52

## 2024-12-14 RX ADMIN — SIMETHICONE 80 MG: 80 TABLET, CHEWABLE ORAL at 11:52

## 2024-12-14 RX ADMIN — ACETAMINOPHEN 975 MG: 325 TABLET ORAL at 06:56

## 2024-12-14 RX ADMIN — ACETAMINOPHEN 975 MG: 325 TABLET ORAL at 11:52

## 2024-12-14 RX ADMIN — SENNOSIDES AND DOCUSATE SODIUM 1 TABLET: 8.6; 5 TABLET ORAL at 09:51

## 2024-12-14 ASSESSMENT — ACTIVITIES OF DAILY LIVING (ADL)
ADLS_ACUITY_SCORE: 32
ADLS_ACUITY_SCORE: 28
ADLS_ACUITY_SCORE: 32
ADLS_ACUITY_SCORE: 28
ADLS_ACUITY_SCORE: 28
ADLS_ACUITY_SCORE: 32

## 2024-12-14 NOTE — PLAN OF CARE
.pp  Problem: Adult Inpatient Plan of Care  Goal: Absence of Hospital-Acquired Illness or Injury  Intervention: Prevent Skin Injury  Recent Flowsheet Documentation  Taken 12/14/2024 0742 by Beatrice Ayala RN  Body Position: position changed independently  Intervention: Prevent Infection  Recent Flowsheet Documentation  Taken 12/14/2024 0742 by Beatrice Ayala, RN  Infection Prevention:   rest/sleep promoted   personal protective equipment utilized   hand hygiene promoted   Goal Outcome Evaluation:

## 2024-12-14 NOTE — PROGRESS NOTES
Obstetrics Post-Op Progress Note         Assessment and Plan:    Assessment: Gwyn MOORE Stacey is a 39 year old  Post-operative day #2 s/p Low transverse primary  section for failed vacuum assisted vaginal delivery attempt, doing well.    Hypotensive yesterday, improved today  Dressing had been removed after being saturated yesterday in the shower. New dressing present today    Acute on chronic anemia--hemoglobin stable at 9.2 today.        L&D complications: Failed application of vacuum extractor or forceps [O66.5]  Mild polyhydramnios (CHELSEA 24)    Prediabetes (A1c 5.7, nl GTT)  IVF pregnancy  hypothyroidism  hepatitis B carrier  h/o IUFD x 2 in 2nd trimeste          Plan:   Ambulation encouraged  Monitor wound for signs of infection  Pain control measures as needed  Reportable signs and symptoms dicussed with the patient  Start iron supplementation on discharge  Anticipate discharge tomorrow           Interval History:   Doing well.  Pain is well-controlled.  No fevers.  No history of wound drainage, warmth or significant erythema.  Good appetite.  Denies chest pain, shortness of breath, nausea or vomiting.  Ambulatory.  Formula feeding             Physical Exam:   Temp: 98  F (36.7  C) Temp src: Oral BP: 100/73 Pulse: 82   Resp: 16 SpO2: 98 % O2 Device: None (Room air)    Vitals:    24   Weight: 60.3 kg (133 lb)     Vital Signs with Ranges  Temp:  [97.8  F (36.6  C)-98.2  F (36.8  C)] 98  F (36.7  C)  Pulse:  [72-97] 82  Resp:  [16-19] 16  BP: ()/(50-73) 100/73  SpO2:  [96 %-98 %] 98 %  I/O last 3 completed shifts:  In: 550 [P.O.:550]  Out: 600 [Urine:600]    Uterine fundus is firm, non-tender and at the level of the umbilicus  Incision C/D/I, new dressing present--mepilex  Extremities Non-tender, +1 bilateral pedal edema          Data:   No results found for this or any previous visit (from the past 24 hours).  Recent Labs   Lab Test 24  0525   AS Negative     Recent Labs   Lab  Test 12/12/24  1809 12/12/24  0525   HGB 9.2* 9.0*     Recent Labs   Lab Test 06/03/24  1506   RUQIGG Positive       Jessica Moritz, APRN CNP

## 2024-12-14 NOTE — PLAN OF CARE
"Goal Outcome Evaluation:      Plan of Care Reviewed With: patient    Overall Patient Progress: improvingOverall Patient Progress: improving    Outcome Evaluation: Patient is vitally stable and up ad no. Taking tylenol and oxy for pain scores 5-7. Bleeding is light, no clots. She has voided and stooled. Initial BP was soft, retaken with patient supine and it was WNL. She is steady on her feet and denies dizziness.    BP 99/59 (BP Location: Right arm, Patient Position: Supine, Cuff Size: Adult Regular)   Pulse 83   Temp 98  F (36.7  C) (Oral)   Resp 16   Ht 1.448 m (4' 9\")   Wt 60.3 kg (133 lb)   SpO2 98%   Breastfeeding Unknown   BMI 28.78 kg/m     "

## 2024-12-14 NOTE — PLAN OF CARE
D:  Patient desires discharge home.  Discharge orders received and entered by provider.  A:  Discharge instructions reviewed with the patient.  All questions and concerns addressed.  R:  Discharge criteria met.  4 Part ID bands double checked.   discharged in car seat with parents.  The nursing assistant escorted patient to car   .  Goal Outcome Evaluation:

## 2024-12-14 NOTE — PLAN OF CARE
Mom is bottle feeding baby.   She is up independently. Spouse is in room and is supportive.   IV is out. Pt is to follow up with PCP in 2 weeks. Incision site is SHERIE with sutures.  Her pain is controlled with scheduled tylenol and ibuprofen. She has oxycodone, and was given this one time this shift.    Problem: Adult Inpatient Plan of Care  Goal: Absence of Hospital-Acquired Illness or Injury  Intervention: Prevent Skin Injury  Recent Flowsheet Documentation  Taken 12/14/2024 0742 by Beatrice Ayala RN  Body Position: position changed independently  Intervention: Prevent Infection  Recent Flowsheet Documentation  Taken 12/14/2024 0742 by Beatrice Ayala RN  Infection Prevention:   rest/sleep promoted   personal protective equipment utilized   hand hygiene promoted     Problem: Adult Inpatient Plan of Care  Goal: Optimal Comfort and Wellbeing  Intervention: Monitor Pain and Promote Comfort  Recent Flowsheet Documentation  Taken 12/14/2024 1152 by Beatrice Ayala RN  Pain Management Interventions: medication (see MAR)  Taken 12/14/2024 1034 by Beatrice Ayala RN  Pain Management Interventions: rest  Taken 12/14/2024 0952 by Beatrice Ayala RN  Pain Management Interventions: medication (see MAR)  Taken 12/14/2024 0742 by Beatrice Ayala RN  Pain Management Interventions: rest  Intervention: Provide Person-Centered Care  Recent Flowsheet Documentation  Taken 12/14/2024 0742 by Beatrice Ayala RN  Trust Relationship/Rapport:   care explained   choices provided   reassurance provided   thoughts/feelings acknowledged   questions encouraged   questions answered   Goal Outcome Evaluation:

## 2024-12-14 NOTE — PLAN OF CARE
"Goal Outcome Evaluation: Progressing      Plan of Care Reviewed With: patient    Overall Patient Progress: improvingOverall Patient Progress: improving    Patient independent in room. Her spouse is at the bedside and is attentive to her and infant.    Patient reported feeling very nauseous. IV Reglan given. Patient took Ibuprofen, Tylenol, and Oxycodone all at the same time at 1739 on previous shift. Patient reported that she had not eaten anything prior to taking meds. Education regarding taking medication on an empty stomach provided. Education regarding each analgesic provided. Recommended to patient to separate medication administration times for each medication in the future for better pain control and less gastric side effects.     Pain managed with Aqua-K heating pad and rest.    Fundus firm, bleeding WDL. VSS.    /73 (BP Location: Right arm, Patient Position: Semi-Ulloa's, Cuff Size: Adult Regular)   Pulse 82   Temp 98  F (36.7  C) (Oral)   Resp 16   Ht 1.448 m (4' 9\")   Wt 60.3 kg (133 lb)   SpO2 98%   Breastfeeding Unknown   BMI 28.78 kg/m      Claudia Baird RN on 12/13/2024 at 9:50 PM        "

## 2024-12-14 NOTE — DISCHARGE SUMMARY
DELIVERY DISCHARGE SUMMARY      Patient Name:  Gwyn Ibarra  :      1985  MRN:      [id number]    Admission Date: 2024  Delivery Date: 24  Gestational Age at Delivery: 37w6d  Discharge Date: 2024    Patient Active Problem List   Diagnosis    Pregnancy resulting from in-vitro fertilization    Hepatitis B, chronic (H)    Fetal demise before 20 weeks with retention of dead fetus    Prediabetes    Multigravida of advanced maternal age    Hypothyroidism    History of intrauterine death    History of ectopic pregnancy    Hepatitis B carrier (H)    Encounter for triage in pregnant patient    Normal labor       Conditions Complicating Pregnancy: as above, failed vacuum    Primary Procedure performed: primary low transverse  section  Other Procedures performed: none    Condition at discharge:  Stable    Discharge Plan:     Follow-up with Obstetrician in 2 weeks    Instructions:   Activity: no driving while on narcotics, no lifting more than 15 pounds for 6 weeks   Regular diet   Medications: See Med Rec      RAFIQ REY MD on 2024 at 11:41 AM

## 2024-12-14 NOTE — PLAN OF CARE
Goal Outcome Evaluation:      Plan of Care Reviewed With: patient    Overall Patient Progress: improvingOverall Patient Progress: improving    Outcome Evaluation: Patient stable with OB assessments. Patient ambulating and voiding without difficulty. Patient taking scheduled medications and PRN oxy for pain. Patient firm, bleeding scant and no visible clots.    Peggy Lin RN

## 2025-01-03 ENCOUNTER — ANCILLARY PROCEDURE (OUTPATIENT)
Dept: ULTRASOUND IMAGING | Facility: CLINIC | Age: 40
End: 2025-01-03
Attending: OTOLARYNGOLOGY
Payer: COMMERCIAL

## 2025-01-03 DIAGNOSIS — E04.1 THYROID NODULE: ICD-10-CM

## 2025-01-03 PROCEDURE — 88173 CYTOPATH EVAL FNA REPORT: CPT | Mod: 26 | Performed by: PATHOLOGY

## 2025-01-03 PROCEDURE — 88172 CYTP DX EVAL FNA 1ST EA SITE: CPT | Mod: TC | Performed by: OTOLARYNGOLOGY

## 2025-01-03 PROCEDURE — 10005 FNA BX W/US GDN 1ST LES: CPT | Performed by: STUDENT IN AN ORGANIZED HEALTH CARE EDUCATION/TRAINING PROGRAM

## 2025-01-03 PROCEDURE — 88172 CYTP DX EVAL FNA 1ST EA SITE: CPT | Mod: 26 | Performed by: PATHOLOGY

## 2025-01-03 RX ORDER — LIDOCAINE HYDROCHLORIDE 10 MG/ML
5 INJECTION, SOLUTION INFILTRATION; PERINEURAL ONCE
Status: COMPLETED | OUTPATIENT
Start: 2025-01-03 | End: 2025-01-03

## 2025-01-03 RX ADMIN — LIDOCAINE HYDROCHLORIDE 3 ML: 10 INJECTION, SOLUTION INFILTRATION; PERINEURAL at 10:19

## 2025-01-06 LAB
PATH REPORT.COMMENTS IMP SPEC: NORMAL
PATH REPORT.COMMENTS IMP SPEC: NORMAL
PATH REPORT.FINAL DX SPEC: NORMAL
PATH REPORT.GROSS SPEC: NORMAL
PATH REPORT.MICROSCOPIC SPEC OTHER STN: NORMAL
PATH REPORT.RELEVANT HX SPEC: NORMAL

## 2025-05-29 ENCOUNTER — LAB REQUISITION (OUTPATIENT)
Dept: LAB | Facility: CLINIC | Age: 40
End: 2025-05-29

## 2025-05-29 DIAGNOSIS — J02.9 ACUTE PHARYNGITIS, UNSPECIFIED: ICD-10-CM

## 2025-05-29 PROCEDURE — 87081 CULTURE SCREEN ONLY: CPT | Performed by: FAMILY MEDICINE

## 2025-05-31 LAB — BACTERIA SPEC CULT: NORMAL

## 2025-08-10 ENCOUNTER — HEALTH MAINTENANCE LETTER (OUTPATIENT)
Age: 40
End: 2025-08-10

## (undated) DEVICE — SUTURE PDS 0 27IN CT1 + VIOLET PDP340H

## (undated) DEVICE — FETAL PILLOW SILICONE BALLOON DEVICE STERILE LF  FP-010-1

## (undated) DEVICE — ADH LIQUID MASTISOL TOPICAL VIAL 2-3ML 0523-48

## (undated) DEVICE — GLOVE BIOGEL PI ULTRATOUCH G SZ 6.0 42160

## (undated) DEVICE — SU DERMABOND ADVANCED .7ML DNX12

## (undated) DEVICE — TUBE BLOOD LAV 4.0ML EDTA PLSTC

## (undated) DEVICE — GLOVE UNDER INDICATOR PI SZ 6 LF 41660

## (undated) DEVICE — ESU GROUND PAD ADULT REM W/15' CORD E7507DB

## (undated) DEVICE — SU VICRYL+ 3-0 CT1 36IN UND VCP944H

## (undated) DEVICE — SOL WATER IRRIG 1000ML BOTTLE 2F7114

## (undated) DEVICE — SU CHROMIC 3-0 SH 27" G122H

## (undated) DEVICE — PAD INSERT MED PEACH 14X6.5 62550

## (undated) DEVICE — RETR VISCERA FISH MED 3204

## (undated) DEVICE — PREP CHLORAPREP 26ML TINTED HI-LITE ORANGE 930815

## (undated) DEVICE — BLADE CLIPPER DISP 4406

## (undated) DEVICE — DRSG STERI STRIP 1/2X4" R1547

## (undated) DEVICE — SUTURE VICRYL+ 2-0 27IN CT-1 UND VCP259H

## (undated) DEVICE — SU CHROMIC 1 CT 27" 803H

## (undated) DEVICE — SUTURE MONOCRYL+ 4-0 PS-2 27IN MCP426H

## (undated) DEVICE — PACK MINOR SINGLE BASIN SSK3001

## (undated) DEVICE — GLOVE SURG PI ULTRA TOUCH M SZ 7 LF 42670

## (undated) DEVICE — GLOVE PI ULTRATCH M LF SZ 6.5 PF CUFF TEXT STRL LF 42665

## (undated) DEVICE — DRESSING FOAM MEPILEX BORDER AG 12X4 POSTOP 498600

## (undated) DEVICE — CUSTOM PACK C-SECTION LHE

## (undated) DEVICE — SOL NACL 0.9% IRRIG 1000ML BOTTLE 2F7124

## (undated) DEVICE — GOWN IMPERVIOUS BREATHABLE SMART LG 89015

## (undated) DEVICE — SUCTION MANIFOLD NEPTUNE 2 SYS 1 PORT 702-025-000

## (undated) RX ORDER — MORPHINE SULFATE 1 MG/ML
INJECTION, SOLUTION EPIDURAL; INTRATHECAL; INTRAVENOUS
Status: DISPENSED
Start: 2024-12-11

## (undated) RX ORDER — LIDOCAINE HYDROCHLORIDE 10 MG/ML
INJECTION, SOLUTION EPIDURAL; INFILTRATION; INTRACAUDAL; PERINEURAL
Status: DISPENSED
Start: 2025-01-03

## (undated) RX ORDER — CEFAZOLIN SODIUM 1 G/3ML
INJECTION, POWDER, FOR SOLUTION INTRAMUSCULAR; INTRAVENOUS
Status: DISPENSED
Start: 2024-12-11